# Patient Record
Sex: FEMALE | Race: WHITE | Employment: OTHER | URBAN - METROPOLITAN AREA
[De-identification: names, ages, dates, MRNs, and addresses within clinical notes are randomized per-mention and may not be internally consistent; named-entity substitution may affect disease eponyms.]

---

## 2021-07-15 ENCOUNTER — APPOINTMENT (OUTPATIENT)
Dept: GENERAL RADIOLOGY | Age: 86
End: 2021-07-15
Payer: MEDICARE

## 2021-07-15 ENCOUNTER — HOSPITAL ENCOUNTER (OUTPATIENT)
Age: 86
Setting detail: OBSERVATION
Discharge: HOME HEALTH | End: 2021-07-18
Attending: INTERNAL MEDICINE | Admitting: INTERNAL MEDICINE
Payer: MEDICARE

## 2021-07-15 DIAGNOSIS — R29.6 FREQUENT FALLS: ICD-10-CM

## 2021-07-15 DIAGNOSIS — R19.5 OCCULT BLOOD POSITIVE STOOL: ICD-10-CM

## 2021-07-15 DIAGNOSIS — R53.1 GENERAL WEAKNESS: ICD-10-CM

## 2021-07-15 DIAGNOSIS — R19.7 NAUSEA VOMITING AND DIARRHEA: Primary | ICD-10-CM

## 2021-07-15 DIAGNOSIS — R11.2 NAUSEA VOMITING AND DIARRHEA: Primary | ICD-10-CM

## 2021-07-15 DIAGNOSIS — S51.011A SKIN TEAR OF ELBOW WITHOUT COMPLICATION, RIGHT, INITIAL ENCOUNTER: ICD-10-CM

## 2021-07-15 PROCEDURE — 86850 RBC ANTIBODY SCREEN: CPT

## 2021-07-15 PROCEDURE — 73080 X-RAY EXAM OF ELBOW: CPT

## 2021-07-15 PROCEDURE — 86900 BLOOD TYPING SEROLOGIC ABO: CPT

## 2021-07-15 PROCEDURE — 83880 ASSAY OF NATRIURETIC PEPTIDE: CPT

## 2021-07-15 PROCEDURE — 83605 ASSAY OF LACTIC ACID: CPT

## 2021-07-15 PROCEDURE — 99282 EMERGENCY DEPT VISIT SF MDM: CPT

## 2021-07-15 PROCEDURE — 85610 PROTHROMBIN TIME: CPT

## 2021-07-15 PROCEDURE — 85025 COMPLETE CBC W/AUTO DIFF WBC: CPT

## 2021-07-15 PROCEDURE — 71045 X-RAY EXAM CHEST 1 VIEW: CPT

## 2021-07-15 PROCEDURE — 80053 COMPREHEN METABOLIC PANEL: CPT

## 2021-07-15 PROCEDURE — 84484 ASSAY OF TROPONIN QUANT: CPT

## 2021-07-15 PROCEDURE — 86901 BLOOD TYPING SEROLOGIC RH(D): CPT

## 2021-07-15 PROCEDURE — 83690 ASSAY OF LIPASE: CPT

## 2021-07-15 RX ORDER — 0.9 % SODIUM CHLORIDE 0.9 %
1000 INTRAVENOUS SOLUTION INTRAVENOUS ONCE
Status: COMPLETED | OUTPATIENT
Start: 2021-07-15 | End: 2021-07-16

## 2021-07-15 RX ORDER — GABAPENTIN 600 MG/1
TABLET ORAL
COMMUNITY
End: 2021-07-16

## 2021-07-15 RX ORDER — OMEPRAZOLE 20 MG/1
CAPSULE, DELAYED RELEASE ORAL
COMMUNITY

## 2021-07-15 RX ORDER — NITROFURANTOIN 25; 75 MG/1; MG/1
CAPSULE ORAL
Status: ON HOLD | COMMUNITY
End: 2021-07-18 | Stop reason: HOSPADM

## 2021-07-15 RX ORDER — HYDRALAZINE HYDROCHLORIDE 25 MG/1
TABLET, FILM COATED ORAL
COMMUNITY
End: 2021-07-16

## 2021-07-15 RX ORDER — LEVOTHYROXINE SODIUM 0.1 MG/1
TABLET ORAL
COMMUNITY
Start: 2021-05-07

## 2021-07-15 RX ORDER — CEPHALEXIN 500 MG/1
CAPSULE ORAL
COMMUNITY
End: 2021-07-16

## 2021-07-15 RX ORDER — ALENDRONATE SODIUM 70 MG/1
TABLET ORAL
COMMUNITY
Start: 2021-04-28

## 2021-07-15 RX ORDER — LISINOPRIL 40 MG/1
TABLET ORAL
COMMUNITY
Start: 2020-11-17

## 2021-07-15 RX ORDER — ACETAMINOPHEN 500 MG
500 TABLET ORAL EVERY 6 HOURS PRN
COMMUNITY
Start: 2021-02-12

## 2021-07-15 RX ORDER — AMIODARONE HYDROCHLORIDE 200 MG/1
TABLET ORAL
COMMUNITY
Start: 2021-03-04

## 2021-07-15 RX ORDER — AMLODIPINE BESYLATE 10 MG/1
TABLET ORAL
COMMUNITY
Start: 2020-12-22

## 2021-07-15 RX ORDER — FLUTICASONE PROPIONATE 50 MCG
SPRAY, SUSPENSION (ML) NASAL
COMMUNITY
End: 2021-07-16

## 2021-07-15 RX ORDER — WARFARIN SODIUM 2.5 MG/1
TABLET ORAL
COMMUNITY

## 2021-07-15 RX ORDER — ONDANSETRON 4 MG/1
4 TABLET, ORALLY DISINTEGRATING ORAL EVERY 6 HOURS PRN
COMMUNITY
Start: 2021-07-15

## 2021-07-15 ASSESSMENT — ENCOUNTER SYMPTOMS
DIARRHEA: 1
NAUSEA: 1
ABDOMINAL PAIN: 0
SHORTNESS OF BREATH: 0
VOMITING: 1

## 2021-07-16 ENCOUNTER — APPOINTMENT (OUTPATIENT)
Dept: CT IMAGING | Age: 86
End: 2021-07-16
Payer: MEDICARE

## 2021-07-16 PROBLEM — K52.9 ACUTE GASTROENTERITIS: Status: ACTIVE | Noted: 2021-07-16

## 2021-07-16 LAB
A/G RATIO: 1.1 (ref 1.1–2.2)
ABO/RH: NORMAL
ALBUMIN SERPL-MCNC: 3.1 G/DL (ref 3.4–5)
ALP BLD-CCNC: 53 U/L (ref 40–129)
ALT SERPL-CCNC: 14 U/L (ref 10–40)
ANION GAP SERPL CALCULATED.3IONS-SCNC: 13 MMOL/L (ref 3–16)
ANTIBODY SCREEN: NORMAL
AST SERPL-CCNC: 27 U/L (ref 15–37)
BACTERIA: ABNORMAL /HPF
BASOPHILS ABSOLUTE: 0 K/UL (ref 0–0.2)
BASOPHILS RELATIVE PERCENT: 0.3 %
BILIRUB SERPL-MCNC: 0.4 MG/DL (ref 0–1)
BILIRUBIN URINE: NEGATIVE
BLOOD, URINE: ABNORMAL
BUN BLDV-MCNC: 23 MG/DL (ref 7–20)
C DIFF TOXIN/ANTIGEN: NORMAL
CALCIUM SERPL-MCNC: 7.7 MG/DL (ref 8.3–10.6)
CHLORIDE BLD-SCNC: 101 MMOL/L (ref 99–110)
CLARITY: ABNORMAL
CO2: 19 MMOL/L (ref 21–32)
COLOR: YELLOW
COMMENT UA: ABNORMAL
CREAT SERPL-MCNC: 0.9 MG/DL (ref 0.6–1.2)
EKG ATRIAL RATE: 88 BPM
EKG DIAGNOSIS: NORMAL
EKG P AXIS: 74 DEGREES
EKG P-R INTERVAL: 136 MS
EKG Q-T INTERVAL: 460 MS
EKG QRS DURATION: 90 MS
EKG QTC CALCULATION (BAZETT): 556 MS
EKG R AXIS: 43 DEGREES
EKG T AXIS: 53 DEGREES
EKG VENTRICULAR RATE: 88 BPM
EOSINOPHILS ABSOLUTE: 0 K/UL (ref 0–0.6)
EOSINOPHILS RELATIVE PERCENT: 0 %
GFR AFRICAN AMERICAN: >60
GFR NON-AFRICAN AMERICAN: 59
GLOBULIN: 2.9 G/DL
GLUCOSE BLD-MCNC: 131 MG/DL (ref 70–99)
GLUCOSE URINE: NEGATIVE MG/DL
HCT VFR BLD CALC: 34.8 % (ref 36–48)
HCT VFR BLD CALC: 36.2 % (ref 36–48)
HCT VFR BLD CALC: 36.8 % (ref 36–48)
HCT VFR BLD CALC: 37.3 % (ref 36–48)
HEMOGLOBIN: 11.6 G/DL (ref 12–16)
HEMOGLOBIN: 11.9 G/DL (ref 12–16)
HEMOGLOBIN: 12 G/DL (ref 12–16)
HEMOGLOBIN: 12.4 G/DL (ref 12–16)
INR BLD: 1.97 (ref 0.88–1.12)
KETONES, URINE: NEGATIVE MG/DL
LACTIC ACID, SEPSIS: 0.9 MMOL/L (ref 0.4–1.9)
LEUKOCYTE ESTERASE, URINE: ABNORMAL
LIPASE: 12 U/L (ref 13–60)
LYMPHOCYTES ABSOLUTE: 0.4 K/UL (ref 1–5.1)
LYMPHOCYTES RELATIVE PERCENT: 5.8 %
MCH RBC QN AUTO: 32.3 PG (ref 26–34)
MCHC RBC AUTO-ENTMCNC: 32.7 G/DL (ref 31–36)
MCV RBC AUTO: 98.9 FL (ref 80–100)
MICROSCOPIC EXAMINATION: YES
MONOCYTES ABSOLUTE: 0.7 K/UL (ref 0–1.3)
MONOCYTES RELATIVE PERCENT: 9.6 %
MUCUS: ABNORMAL /LPF
NEUTROPHILS ABSOLUTE: 6.4 K/UL (ref 1.7–7.7)
NEUTROPHILS RELATIVE PERCENT: 84.3 %
NITRITE, URINE: NEGATIVE
OCCULT BLOOD DIAGNOSTIC: ABNORMAL
PDW BLD-RTO: 14.2 % (ref 12.4–15.4)
PH UA: 6 (ref 5–8)
PLATELET # BLD: 267 K/UL (ref 135–450)
PMV BLD AUTO: 6.5 FL (ref 5–10.5)
POTASSIUM REFLEX MAGNESIUM: 3.8 MMOL/L (ref 3.5–5.1)
PRO-BNP: 716 PG/ML (ref 0–449)
PROTEIN UA: 30 MG/DL
PROTHROMBIN TIME: 22.9 SEC (ref 9.9–12.7)
RBC # BLD: 3.73 M/UL (ref 4–5.2)
RBC UA: ABNORMAL /HPF (ref 0–4)
SODIUM BLD-SCNC: 133 MMOL/L (ref 136–145)
SPECIFIC GRAVITY UA: 1.01 (ref 1–1.03)
TOTAL PROTEIN: 6 G/DL (ref 6.4–8.2)
TROPONIN: <0.01 NG/ML
URINE PATH CONSULT: YES
URINE REFLEX TO CULTURE: ABNORMAL
URINE TYPE: ABNORMAL
UROBILINOGEN, URINE: 0.2 E.U./DL
WAXY CASTS: ABNORMAL /LPF
WBC # BLD: 7.6 K/UL (ref 4–11)
WBC UA: ABNORMAL /HPF (ref 0–5)

## 2021-07-16 PROCEDURE — 6370000000 HC RX 637 (ALT 250 FOR IP): Performed by: INTERNAL MEDICINE

## 2021-07-16 PROCEDURE — 85018 HEMOGLOBIN: CPT

## 2021-07-16 PROCEDURE — 74177 CT ABD & PELVIS W/CONTRAST: CPT

## 2021-07-16 PROCEDURE — 36415 COLL VENOUS BLD VENIPUNCTURE: CPT

## 2021-07-16 PROCEDURE — 93005 ELECTROCARDIOGRAM TRACING: CPT | Performed by: PHYSICIAN ASSISTANT

## 2021-07-16 PROCEDURE — G0378 HOSPITAL OBSERVATION PER HR: HCPCS

## 2021-07-16 PROCEDURE — 87449 NOS EACH ORGANISM AG IA: CPT

## 2021-07-16 PROCEDURE — 97166 OT EVAL MOD COMPLEX 45 MIN: CPT

## 2021-07-16 PROCEDURE — 87505 NFCT AGENT DETECTION GI: CPT

## 2021-07-16 PROCEDURE — 2580000003 HC RX 258: Performed by: PHYSICIAN ASSISTANT

## 2021-07-16 PROCEDURE — 6360000004 HC RX CONTRAST MEDICATION: Performed by: PHYSICIAN ASSISTANT

## 2021-07-16 PROCEDURE — G0328 FECAL BLOOD SCRN IMMUNOASSAY: HCPCS

## 2021-07-16 PROCEDURE — 70450 CT HEAD/BRAIN W/O DYE: CPT

## 2021-07-16 PROCEDURE — 87324 CLOSTRIDIUM AG IA: CPT

## 2021-07-16 PROCEDURE — 72125 CT NECK SPINE W/O DYE: CPT

## 2021-07-16 PROCEDURE — 93010 ELECTROCARDIOGRAM REPORT: CPT | Performed by: INTERNAL MEDICINE

## 2021-07-16 PROCEDURE — 81001 URINALYSIS AUTO W/SCOPE: CPT

## 2021-07-16 PROCEDURE — 85014 HEMATOCRIT: CPT

## 2021-07-16 PROCEDURE — 97161 PT EVAL LOW COMPLEX 20 MIN: CPT

## 2021-07-16 PROCEDURE — 2580000003 HC RX 258: Performed by: INTERNAL MEDICINE

## 2021-07-16 RX ORDER — ACETAMINOPHEN 325 MG/1
650 TABLET ORAL EVERY 6 HOURS PRN
Status: DISCONTINUED | OUTPATIENT
Start: 2021-07-16 | End: 2021-07-18 | Stop reason: HOSPADM

## 2021-07-16 RX ORDER — SODIUM CHLORIDE 0.9 % (FLUSH) 0.9 %
5-40 SYRINGE (ML) INJECTION EVERY 12 HOURS SCHEDULED
Status: DISCONTINUED | OUTPATIENT
Start: 2021-07-16 | End: 2021-07-18 | Stop reason: HOSPADM

## 2021-07-16 RX ORDER — LISINOPRIL 20 MG/1
40 TABLET ORAL DAILY
Status: DISCONTINUED | OUTPATIENT
Start: 2021-07-16 | End: 2021-07-18 | Stop reason: HOSPADM

## 2021-07-16 RX ORDER — DONEPEZIL HYDROCHLORIDE 5 MG/1
5 TABLET, FILM COATED ORAL NIGHTLY
Status: DISCONTINUED | OUTPATIENT
Start: 2021-07-16 | End: 2021-07-18 | Stop reason: HOSPADM

## 2021-07-16 RX ORDER — LEVOTHYROXINE SODIUM 0.1 MG/1
100 TABLET ORAL DAILY
Status: DISCONTINUED | OUTPATIENT
Start: 2021-07-16 | End: 2021-07-18 | Stop reason: HOSPADM

## 2021-07-16 RX ORDER — AMLODIPINE BESYLATE 5 MG/1
10 TABLET ORAL DAILY
Status: DISCONTINUED | OUTPATIENT
Start: 2021-07-16 | End: 2021-07-18 | Stop reason: HOSPADM

## 2021-07-16 RX ORDER — PREDNISONE 1 MG/1
5 TABLET ORAL
Status: DISCONTINUED | OUTPATIENT
Start: 2021-07-17 | End: 2021-07-18 | Stop reason: HOSPADM

## 2021-07-16 RX ORDER — DONEPEZIL HYDROCHLORIDE 5 MG/1
5 TABLET, FILM COATED ORAL NIGHTLY
COMMUNITY

## 2021-07-16 RX ORDER — MECLIZINE HCL 12.5 MG/1
12.5 TABLET ORAL 3 TIMES DAILY PRN
Status: DISCONTINUED | OUTPATIENT
Start: 2021-07-16 | End: 2021-07-18 | Stop reason: HOSPADM

## 2021-07-16 RX ORDER — WARFARIN SODIUM 2 MG/1
2 TABLET ORAL DAILY
Status: DISCONTINUED | OUTPATIENT
Start: 2021-07-16 | End: 2021-07-18 | Stop reason: HOSPADM

## 2021-07-16 RX ORDER — PANTOPRAZOLE SODIUM 40 MG/10ML
40 INJECTION, POWDER, LYOPHILIZED, FOR SOLUTION INTRAVENOUS DAILY
Status: DISCONTINUED | OUTPATIENT
Start: 2021-07-16 | End: 2021-07-18 | Stop reason: HOSPADM

## 2021-07-16 RX ORDER — ONDANSETRON 4 MG/1
4 TABLET, ORALLY DISINTEGRATING ORAL EVERY 8 HOURS PRN
Status: CANCELLED | OUTPATIENT
Start: 2021-07-16

## 2021-07-16 RX ORDER — METOPROLOL SUCCINATE 25 MG/1
25 TABLET, EXTENDED RELEASE ORAL DAILY
Status: DISCONTINUED | OUTPATIENT
Start: 2021-07-16 | End: 2021-07-18 | Stop reason: HOSPADM

## 2021-07-16 RX ORDER — SODIUM CHLORIDE, SODIUM LACTATE, POTASSIUM CHLORIDE, CALCIUM CHLORIDE 600; 310; 30; 20 MG/100ML; MG/100ML; MG/100ML; MG/100ML
INJECTION, SOLUTION INTRAVENOUS CONTINUOUS
Status: ACTIVE | OUTPATIENT
Start: 2021-07-16 | End: 2021-07-17

## 2021-07-16 RX ORDER — ONDANSETRON 2 MG/ML
4 INJECTION INTRAMUSCULAR; INTRAVENOUS EVERY 6 HOURS PRN
Status: CANCELLED | OUTPATIENT
Start: 2021-07-16

## 2021-07-16 RX ORDER — HYDRALAZINE HYDROCHLORIDE 25 MG/1
75 TABLET, FILM COATED ORAL 2 TIMES DAILY
Status: DISCONTINUED | OUTPATIENT
Start: 2021-07-16 | End: 2021-07-18 | Stop reason: HOSPADM

## 2021-07-16 RX ORDER — ACETAMINOPHEN 650 MG/1
650 SUPPOSITORY RECTAL EVERY 6 HOURS PRN
Status: DISCONTINUED | OUTPATIENT
Start: 2021-07-16 | End: 2021-07-18 | Stop reason: HOSPADM

## 2021-07-16 RX ORDER — SODIUM CHLORIDE 9 MG/ML
25 INJECTION, SOLUTION INTRAVENOUS PRN
Status: DISCONTINUED | OUTPATIENT
Start: 2021-07-16 | End: 2021-07-18 | Stop reason: HOSPADM

## 2021-07-16 RX ORDER — POLYETHYLENE GLYCOL 3350 17 G/17G
17 POWDER, FOR SOLUTION ORAL DAILY PRN
Status: DISCONTINUED | OUTPATIENT
Start: 2021-07-16 | End: 2021-07-18 | Stop reason: HOSPADM

## 2021-07-16 RX ORDER — SODIUM CHLORIDE 0.9 % (FLUSH) 0.9 %
10 SYRINGE (ML) INJECTION PRN
Status: DISCONTINUED | OUTPATIENT
Start: 2021-07-16 | End: 2021-07-18 | Stop reason: HOSPADM

## 2021-07-16 RX ADMIN — WARFARIN SODIUM 2 MG: 2 TABLET ORAL at 19:46

## 2021-07-16 RX ADMIN — DONEPEZIL HYDROCHLORIDE 5 MG: 5 TABLET, FILM COATED ORAL at 19:46

## 2021-07-16 RX ADMIN — Medication 10 ML: at 19:47

## 2021-07-16 RX ADMIN — HYDRALAZINE HYDROCHLORIDE 75 MG: 25 TABLET, FILM COATED ORAL at 19:47

## 2021-07-16 RX ADMIN — METOPROLOL SUCCINATE 25 MG: 25 TABLET, EXTENDED RELEASE ORAL at 19:52

## 2021-07-16 RX ADMIN — SODIUM CHLORIDE, POTASSIUM CHLORIDE, SODIUM LACTATE AND CALCIUM CHLORIDE: 600; 310; 30; 20 INJECTION, SOLUTION INTRAVENOUS at 22:57

## 2021-07-16 RX ADMIN — SODIUM CHLORIDE, POTASSIUM CHLORIDE, SODIUM LACTATE AND CALCIUM CHLORIDE: 600; 310; 30; 20 INJECTION, SOLUTION INTRAVENOUS at 06:52

## 2021-07-16 RX ADMIN — ACETAMINOPHEN 650 MG: 325 TABLET ORAL at 06:56

## 2021-07-16 RX ADMIN — Medication 10 ML: at 10:08

## 2021-07-16 RX ADMIN — ACETAMINOPHEN 650 MG: 325 TABLET ORAL at 19:47

## 2021-07-16 RX ADMIN — LISINOPRIL 40 MG: 20 TABLET ORAL at 09:08

## 2021-07-16 RX ADMIN — LEVOTHYROXINE SODIUM 100 MCG: 0.1 TABLET ORAL at 06:56

## 2021-07-16 RX ADMIN — IOPAMIDOL 75 ML: 755 INJECTION, SOLUTION INTRAVENOUS at 00:40

## 2021-07-16 RX ADMIN — SODIUM CHLORIDE 1000 ML: 9 INJECTION, SOLUTION INTRAVENOUS at 01:30

## 2021-07-16 RX ADMIN — AMLODIPINE BESYLATE 10 MG: 5 TABLET ORAL at 09:08

## 2021-07-16 ASSESSMENT — PAIN SCALES - GENERAL
PAINLEVEL_OUTOF10: 0
PAINLEVEL_OUTOF10: 5
PAINLEVEL_OUTOF10: 0

## 2021-07-16 NOTE — ED TRIAGE NOTES
Pt seen  At  today :  [facility] ED Note    Date of service: 7/15/2021    Reason for Visit: Medical Problem (pt. c/o not feeling well, pt.  sts. pt. may have food poisoning. )    Patient History     HPI Doyle Barber is a 26-year-old female who presented to the emergency department for evaluation of nausea and vomiting. She stated her  has had similar symptoms and were concerned for patient have food poisoning or gastroenteritis. She has had a 1 to 2-day history of intermittent nausea and vomiting. She denies any abdominal pain. She reports no blood in emesis or blood in stool. She denies any cough, dyspnea shortness of breath. Past Medical History:   Diagnosis Date    Atrial fibrillation (CMS Dx) 01/02/2013   Dr Keyshawn Leslie Bladder cancer (CMS Dx) 0887R   treated and no complications since    Fall    Generalized headaches    GERD (gastroesophageal reflux disease)    Hypertension   Echo 3/25/10: normal LVEF, mild AI, mild MR. Normal adenosine myoview GXT 3/6/07.  Hypothyroidism    IBS (irritable bowel syndrome)   last colonoscopy 5/26/10, Dr. Veronica Arellano    Osteoarthritis   Back    Osteoporosis    Rheumatoid aortitis     Past Surgical History:   Procedure Laterality Date    CATARACT EXTRACTION W/ INTRAOCULAR LENS IMPLANT 3/22/13   right    HIP FRACTURE SURGERY Right 3/13    hip replacement     Patient reports that she has never smoked. She has never used smokeless tobacco. She reports that she does not drink alcohol and does not use drugs. Previous Medications   ACETAMINOPHEN (TYLENOL) 500 MG TABLET Take 1 tablet (500 mg total) by mouth every 6 hours as needed. ALENDRONATE (FOSAMAX) 70 MG TABLET Take 70 mg by mouth Every Saturday. AMIODARONE (PACERONE) 200 MG TABLET Take 100 mg by mouth daily. AMLODIPINE (NORVASC) 10 MG TABLET Take 1 tablet (10 mg total) by mouth daily. DONEPEZIL (ARICEPT) 5 MG TABLET Take 1 tablet (5 mg total) by mouth daily.  Indications: MEMORY HYDRALAZINE (APRESOLINE) 25 MG TABLET Take 75 mg by mouth in the morning and at bedtime. LEVOTHYROXINE (SYNTHROID) 100 MCG TABLET Take 1 tablet (100 mcg total) by mouth daily. Indications: hypothyroidism   LIDOCAINE (LIDODERM) 5 % Place 1 patch onto the skin daily. Apply patch for 12 hours and then remove patch and leave off for 12 hours. Indications: apply to arm   LISINOPRIL (PRINIVIL,ZESTRIL) 40 MG TABLET Take 40 mg by mouth daily with dinner. METOPROLOL SUCCINATE (TOPROL-XL) 25 MG 24 HR TABLET Take 25 mg by mouth daily. MIRTAZAPINE (REMERON) 15 MG TABLET Take 1 tablet (15 mg total) by mouth at bedtime. Indications: MOOD   OMEPRAZOLE (PRILOSEC) 20 MG CAPSULE Take 20 mg by mouth every other day. PREDNISONE (DELTASONE) 5 MG TABLET Take 5 mg by mouth daily. PSYLLIUM (METAMUCIL) POWDER Take 1 packet by mouth daily as needed. WARFARIN SODIUM (WARFARIN ORAL) Take by mouth See Admin Instructions. Warfarin Therapy as of 2/12/21:  Indication: a.fib  Current dose and frequency: 2.5 mg every evening with dinner  Last dose taken: 2/11/21  Missed doses (past 7 days): 0  Provider or clinic that manages warfarin therapy:   Goal INR range: 2-2.5  Patient has 2.5 mg (tablet strength) tablets        Allergies:    Allergies as of 07/15/2021 - Fully Reviewed 07/15/2021   Allergen Reaction Noted    Clonidine Swelling 07/01/2013    Cymbalta [duloxetine] 03/08/2019    Gabapentin 04/22/2019    Sertraline Nausea Only 01/30/2019    Statins-hmg-coa reductase inhibitors 12/10/2012    Sulfa (sulfonamide antibiotics) 12/10/2012     Family history has been reviewed electronic medical record  Review of Systems     Review of Systems  Constitutional: See HPI  Eyes: Denies change in visual acuity   HENT: Denies nasal congestion or sore throat   Respiratory: Denies cough or shortness of breath   Cardiovascular: Denies chest pain or edema   GI: See HPI  : Denies dysuria   Musculoskeletal: Denies back pain or joint pain   Integument: Denies rash   Neurologic: Denies headache, focal weakness or sensory changes   Endocrine: Denies polyuria or polydipsia   Lymphatic: Denies swollen glands   Psychiatric: Denies depression or anxiety    Physical Exam     ED Triage Vitals [07/15/21 1128]   Vital Signs Group   Temp 99.4 °F (37.4 °C)   Temp Source Oral   Heart Rate 86   Heart Rate Source Monitor   Resp 18   SpO2 92 %   BP   MAP (mmHg)   BP Location Right arm   BP Method Automatic   Patient Position Sitting   SpO2 92 %   O2 Device None (Room air)     Physical Exam    Constitutional: Well developed, well nourished, no acute distress, non-toxic appearance   Eyes: PERRL, conjunctiva normal sclera are white and extraocular wounds are intact  HENT: Atraumatic, external ears normal, TMs are clear bilateral nose normal, oropharynx moist, no pharyngeal exudates. Neck- normal range of motion, no tenderness, supple   Respiratory: No respiratory distress, normal breath sounds, no rales, no wheezing   Cardiovascular: Normal rate, normal rhythm, no murmurs, no gallops, no rubs   GI: Soft, nondistended, normal bowel sounds, nontender, no organomegaly, no mass, no rebound, no guarding   : No costovertebral angle tenderness   Musculoskeletal: No edema, no tenderness, no deformities.  Back- no tenderness  Integument: Well hydrated, no rash   Neurologic: Alert & oriented x 3, CN 2-12 normal, normal motor function, normal sensory function, no focal deficits noted   Psychiatric: Speech and behavior appropriate  Diagnostic Studies     Labs:    Please see electronic medical record for any tests performed in the ED    Radiology:    Please see electronic medical record for any tests performed in the ED    EKG:  EKG Interpretation    Interpreted by me    Rhythm: normal sinus   Rate:80  Axis: normal  Ectopy: none  Conduction: normal  ST Segments: no acute change  T Waves: no acute change  Q Waves: none    Clinical Impression: no acute changes and normal EKG    Emergency Department Procedures     Procedures none    ED Course and MDM     Jennifer Cummins is a 80 y.o. female who presented to the emergency department with Medical Problem (pt. c/o not feeling well, pt.  sts. pt. may have food poisoning. )  The patient presented to the emergency department for concerns of gastroenteritis. The patient was having intermittent nausea vomiting presents for evaluation assessment for concerns of dehydration and vomiting. The patient presentation EKG which demonstrates sinus rhythm normal axis with no acute changes. The patient will be treated acutely for concerns of dehydration. She received IV fluid hydration. The patient was given IV Zofran. The patient had a initial screen labs obtained with a normal CBC, renal, LFTs and lipase. The patient high-sensitivity troponin are negative and UA is negative. The patient was discharged home outpatient follow-up with discharge instructions regarding gastroenteritis and nausea vomiting.       Critical Care Time (Attendings)   None  Final Impression is gastroenteritis  Disposition is discharged home      Carissa Casey MD  07/15/21 1425      Electronically signed by Carissa Casey MD at 07/15/2021 2:25 PM EDT

## 2021-07-16 NOTE — PROGRESS NOTES
Occupational Therapy   Occupational Therapy Initial Assessment  Date: 2021   Patient Name: Osvaldo Marroquin  MRN: [id number]     : 1929    Date of Service: 2021    Discharge Recommendations:     OT Equipment Recommendations  Equipment Needed: Yes  Other: rw, shower seat  Osvaldo Marroquin scored a 18/24 on the AM-PAC ADL Inpatient form. Current research shows that an AM-PAC score of 18 or greater is typically associated with a discharge to the patient's home setting. Based on the patient's AM-PAC score, and their current ADL deficits, it is recommended that the patient have 2-3 sessions per week of Occupational Therapy at d/c to increase the patient's independence. At this time, this patient demonstrates the endurance and safety to discharge home   and a follow up treatment frequency of 2-3x/wk. Please see assessment section for further patient specific details. If patient discharges prior to next session this note will serve as a discharge summary. Please see below for the latest assessment towards goals. HOME HEALTH CARE: LEVEL 2 SOCIAL     - Initial home health evaluation to occur within 24-48 hours, in patient home   - Therapy to evaluate with goal of regaining prior level of functioning   - Therapy to evaluate if patient has [address] needs for personal care   -  evaluation within 24-48 hours, includes evaluation of resources and insurance to determine AL/IL/LTC/Medicaid options   - [place] [facility]ging Referral   - Da Figueroa to discuss home support and care needs post discharge within two weeks of discharge. Assessment   Performance deficits / Impairments: Decreased functional mobility ; Decreased cognition;Decreased high-level IADLs;Decreased ADL status; Decreased safe awareness  Treatment Diagnosis: decreased independence with ADL related to late affects of c diff  Prognosis: Good  Decision Making: Medium Complexity  OT Education: OT Role;Plan of Care  Patient Education: fall prevention with use of rw  Barriers to Learning: cognition  REQUIRES OT FOLLOW UP: Yes  Activity Tolerance  Activity Tolerance: Patient Tolerated treatment well           Patient Diagnosis(es): The primary encounter diagnosis was Nausea vomiting and diarrhea. Diagnoses of General weakness, Frequent falls, Skin tear of elbow without complication, right, initial encounter, and Occult blood positive stool were also pertinent to this visit. has a past medical history of bladder, Clostridium difficile infection, Hypercholesterolaemia, Hypertension, Hypothyroidism, IBS (irritable bowel syndrome), Osteoarthritis, and Osteoporosis. has a past surgical history that includes Bladder surgery.     Treatment Diagnosis: decreased independence with ADL related to late affects of c diff      Restrictions  Restrictions/Precautions  Restrictions/Precautions: Fall Risk, General Precautions, Contact Precautions    Subjective   General  Chart Reviewed: Yes  Family / Caregiver Present: No  Diagnosis: c-diff  Subjective  Subjective: Patient in bed and agreeable to therapy  Patient Currently in Pain: No  Pain Assessment  Pain Assessment: 0-10  Pain Level: 0  Vital Signs  Temp: 97.6 °F (36.4 °C)  Temp Source: Axillary  Pulse: 85  Heart Rate Source: Monitor  Resp: 18  BP: (!) 152/65  BP Location: Left upper arm  MAP (mmHg): 93  Patient Position: Semi fowlers  Level of Consciousness: Alert (0)  MEWS Score: 1  Patient Currently in Pain: No  Oxygen Therapy  SpO2: 94 %  Pulse Oximeter Device Mode: Intermittent  Pulse Oximeter Device Location: Finger  O2 Device: None (Room air)  Social/Functional History  Social/Functional History  Lives With: Spouse  Type of Home: House  Home Layout: Two level  Home Access: Stairs to enter without rails  Entrance Stairs - Number of Steps: 1  Bathroom Shower/Tub: Tub/Shower unit (tub bathes, bath mat)  Home Equipment: Quad cane  ADL Assistance: Independent  Homemaking Assistance: Independent  Ambulation Assistance: Independent  Transfer Assistance: Independent  Active : Yes (patient does not drive any more)  Additional Comments: patient reports two falls in the past 6 mos       Objective   Vision: Impaired  Vision Exceptions: Wears glasses for reading  Hearing: Exceptions to [facility]  Hearing Exceptions: Hard of hearing/hearing concerns;Bilateral hearing aid    Orientation  Overall Orientation Status: Impaired  Orientation Level: Disoriented to time  Observation/Palpation  Posture: Good  Balance  Sitting Balance: Supervision  Standing Balance: Stand by assistance  Standing Balance  Time: up to 5 minutes  Activity: ambulation in room  Comment: quad cane and rw, needed contact with quad cane, no contact with rw  Functional Mobility  Functional - Mobility Device: Rolling Walker  Activity: Other  Assist Level: Stand by assistance  Toilet Transfers  Toilet - Technique: Stand step  Toilet Transfer: Supervision  ADL  Grooming: Supervision  UE Bathing: Supervision  LE Bathing: Supervision  UE Dressing: Supervision  LE Dressing: Supervision  Toileting: Supervision  Additional Comments: recommend use of rw for safety during mobility        Bed mobility  Bridging: Supervision  Rolling to Left: Supervision  Rolling to Right: Supervision  Supine to Sit: Supervision  Sit to Supine: Supervision  Scooting: Supervision  Transfers  Sit to stand: Supervision  Stand to sit: Supervision     Cognition  Overall Cognitive Status: Exceptions  Arousal/Alertness: Appropriate responses to stimuli  Following Commands:  Follows one step commands consistently  Attention Span: Attends with cues to redirect  Memory: Decreased recall of biographical Information;Decreased recall of recent events;Decreased short term memory  Safety Judgement: Decreased awareness of need for safety  Problem Solving: Assistance required to generate solutions;Assistance required to implement solutions;Assistance required to identify errors made;Assistance required to correct errors made;Decreased awareness of errors  Insights: Decreased awareness of deficits  Initiation: Requires cues for some  Sequencing: Requires cues for some                                        Plan   Plan  Times per week: 3-5  Times per day: Daily  Current Treatment Recommendations: Functional Mobility Training, Balance Training, Self-Care / ADL, Patient/Caregiver Education & Training, Equipment Evaluation, Education, & procurement, Safety Education & Training    G-Code     OutComes Score                                                  AM-PAC Score        AM-PAC Inpatient Daily Activity Raw Score: 18 (07/16/21 1400)  AM-PAC Inpatient ADL T-Scale Score : 38.66 (07/16/21 1400)  ADL Inpatient CMS 0-100% Score: 46.65 (07/16/21 1400)  ADL Inpatient CMS G-Code Modifier : CK (07/16/21 1400)    Goals  Short term goals  Short term goal 1: modified indepenent toileting  Short term goal 2: modified independent functional mobility with use of rw  Short term goal 3: modified independent bathing, dressing  Patient Goals   Patient goals : patient's goal is to return home with        Therapy Time   Individual Concurrent Group Co-treatment   Time In [address] [place] [address] Drive         Time Out 1357         Minutes [address], OT

## 2021-07-16 NOTE — ED PROVIDER NOTES
Patient was seen and evaluated independently by JOSE. I was immediately available for consultation if needed. This note is provided for EKG interpretation only.     EKG:    EKG was reviewed by emergency department physician in the absence of a cardiologist    Narrow complex sinus rhythm, rate 88, normal axis, normal WA and QRS intervals, prolonged Qtc, no ST elevations or depressions, normal t-wave morphology, impression NSR, no STEMI       Soni Mcintosh DO  07/16/21 6263

## 2021-07-16 NOTE — PROGRESS NOTES
Physical Therapy    Facility/Department: [address]  Initial Assessment    NAME: Meenu White  : 1929  MRN: [id number]    Date of Service: 2021    Discharge Recommendations:Holly Marquez scored a 18/24 on the AM-PAC short mobility form. Current research shows that an AM-PAC score of 18 or greater is typically associated with a discharge to the patient's home setting. Based on the patient's AM-PAC score and their current functional mobility deficits, it is recommended that the patient have 2-3 sessions per week of Physical Therapy at d/c to increase the patient's independence. At this time, this patient demonstrates the endurance and safety to discharge home with (home services) and a follow up treatment frequency of 2-3x/wk. Please see assessment section for further patient specific details. If patient discharges prior to next session this note will serve as a discharge summary. Please see below for the latest assessment towards goals. HOME HEALTH CARE: LEVEL 3 SAFETY     - Initial home health evaluation to occur within 24-48 hours, in patient home   - Therapy evaluations in home within 24-48 hours of discharge; including DME and home safety   - Frontload therapy 5 days, then 3x a week   - Therapy to evaluate if patient has [address] needs for personal care   -  evaluation within 24-48 hours, includes evaluation of resources and insurance to determine AL, [place], LTC, and Medicaid options         PT Equipment Recommendations  Equipment Needed: Yes  Mobility Devices: Giancarlo Zafar: Rolling    Assessment   Body structures, Functions, Activity limitations: Decreased functional mobility ; Decreased ADL status; Decreased strength;Decreased balance;Decreased safe awareness;Decreased endurance  Assessment: Pt presents as below her baseline function and would benefit from skilled PT services to promote safe return to PLOF.   Prognosis: Good  Decision Making: Low Complexity  PT Education: Goals;PT Role;Plan of Care  Patient Education: D/C recommendations--pt verbalized understanding, would likely benefit from reinforcement  REQUIRES PT FOLLOW UP: Yes  Activity Tolerance  Activity Tolerance: Patient Tolerated treatment well       Patient Diagnosis(es): The primary encounter diagnosis was Nausea vomiting and diarrhea. Diagnoses of General weakness, Frequent falls, Skin tear of elbow without complication, right, initial encounter, and Occult blood positive stool were also pertinent to this visit. has a past medical history of bladder, Clostridium difficile infection, Hypercholesterolaemia, Hypertension, Hypothyroidism, IBS (irritable bowel syndrome), Osteoarthritis, and Osteoporosis. has a past surgical history that includes Bladder surgery. Restrictions  Restrictions/Precautions  Restrictions/Precautions: Fall Risk, General Precautions, Contact Precautions (High falls risk, Contact Plus)  Required Braces or Orthoses?: No  Position Activity Restriction  Other position/activity restrictions: Kody Andrews is a 80 y.o. female who presents to the emergency department for evaluation of generalized weakness, nausea, vomiting, diarrhea as well as fall today. Patient ate a pepperoni pizza and developed some abdominal pain, nausea and vomiting followed by diarrhea.  did not eat the same pizza and has not had similar symptoms. Patient states she is currently not feeling nauseated does not have any abdominal pain and has not had any diarrhea since arriving here in the emergency department. However family member state this has been ongoing for the past few days.  who is at bedside states she is so weak due to the diarrhea that she is unable to walk. She has had 3 falls at home today. 1 has resulted in a large skin tear to her right elbow. Patient has associated pain with movement of this elbow. Unsure if the patient hit her head. No loss of consciousness.   She is on a blood thinner due to atrial fibrillation. Patient was seen at [facility] earlier today, discharged home with Zofran.  states he is unable to care for the patient at home with her being so weak and he is afraid that she will continue to fall. He states that she has continued to have nonstop diarrhea at home. He states that she is not eating or drinking much by mouth. Denies any fevers.   Vision/Hearing  Vision: Impaired  Vision Exceptions: Wears glasses for reading  Hearing: Exceptions to [facility]  Hearing Exceptions: Hard of hearing/hearing concerns;Bilateral hearing aid     Subjective  General  Chart Reviewed: Yes  Response To Previous Treatment: Not applicable  Family / Caregiver Present: No  Diagnosis: Acute gastroenteritis  Follows Commands: Within Functional Limits  General Comment  Comments: Pt supine in bed upon arrival.  Subjective  Subjective: Pt agreeable to PT/OT eval.  Pain Screening  Patient Currently in Pain: Denies  Vital Signs  Patient Currently in Pain: Denies       Orientation  Orientation  Overall Orientation Status: Within Functional Limits (oriented to year, not to month)  Social/Functional History  Social/Functional History  Lives With: Spouse  Type of Home: House  Home Layout: Two level  Home Access: Stairs to enter without rails  Entrance Stairs - Number of Steps: 1  Bathroom Shower/Tub: Tub/Shower unit (tub bathes, bath mat)  Home Equipment: Quad cane  ADL Assistance: Independent  Homemaking Assistance: Independent  Ambulation Assistance: Independent  Transfer Assistance: Independent  Active : Yes (patient does not drive any more)  Additional Comments: patient reports two falls in the past 6 mos  Cognition        Objective     Observation/Palpation  Posture: Good    AROM RLE (degrees)  RLE AROM: WFL  AROM LLE (degrees)  LLE AROM : WFL  Strength RLE  Strength RLE: WFL  Comment: Grossly 4+/5  Strength LLE  Strength LLE: WFL  Comment: Grossly 4-/5  Tone RLE  RLE Tone: Normotonic  Tone LLE  LLE Tone: Normotonic  Motor Control  Gross Motor?: WFL  Sensation  Overall Sensation Status: WFL  Bed mobility  Bridging: Supervision  Rolling to Left: Supervision  Rolling to Right: Supervision  Supine to Sit: Supervision  Sit to Supine: Supervision  Scooting: Supervision  Transfers  Sit to Stand: Stand by assistance  Stand to sit: Stand by assistance  Ambulation  Ambulation?: Yes  More Ambulation?: Yes  Ambulation 1  Surface: level tile  Device: Small Christian Dunreith  Assistance: Contact guard assistance  Quality of Gait: Pt ambulates with slightly decreased step length, L ankle inversion, narrow ROBI, unsteadiness of gait during turns, no LOB. Distance: ~30 ft  Comments: Pt mildly unsteady throughout ambulation  Ambulation 2  Surface - 2: level tile  Device 2: Rolling Walker  Assistance 2: Stand by assistance  Quality of Gait 2: Pt demonstrates improved overall stability of gait, maintains decreased ella, decreased inversion of LLE, no LOB. Distance: ~40 ft  Stairs/Curb  Stairs?: No     Balance  Posture: Good  Sitting - Static: Good  Sitting - Dynamic: Good  Standing - Static: Fair;+  Standing - Dynamic: [address]  Times per week: 3-5x  Times per day: Daily  Current Treatment Recommendations: Strengthening, ROM, Balance Training, Functional Mobility Training, Transfer Training, Endurance Training, Gait Training, Stair training, Safety Education & Training, Patient/Caregiver Education & Training  Safety Devices  Type of devices:  All fall risk precautions in place, Gait belt, Patient at risk for falls, Nurse notified, Bed alarm in place, Call light within reach, Left in bed, Telesitter in use  Restraints  Initially in place: No    G-Code       OutComes Score                                                  AM-PAC Score  AM-PAC Inpatient Mobility Raw Score : 18 (07/16/21 1436)  AM-PAC Inpatient T-Scale Score : 43.63 (07/16/21 1436)  Mobility Inpatient CMS 0-100% Score: 46.58 (07/16/21 1436)  Mobility Inpatient CMS G-Code Modifier : CK (07/16/21 1436)          Goals  Short term goals  Time Frame for Short term goals:  To be met prior to discharge  Short term goal 1: Pt will complete bed mobility with mod I  Short term goal 2: Pt will complete sit to/from stand with mod I  Short term goal 3: Pt will ambulate 100 ft with LRAD and mod I  Short term goal 4: Pt will navigate up/down 4 steps with HR and supervision       Therapy Time   Individual Concurrent Group Co-treatment   Time In 1328         Time Out 1357         Minutes 29         Timed Code Treatment Minutes: [facility], PT   [facility], PT, DPT, [id number]

## 2021-07-16 NOTE — ED TRIAGE NOTES
states she is weak and still has diarrhea. ABCs intact and weak. Pt fell and has skin tear right upper arm, dressing in place . States she has fallen 3 times in the last 2 days d/t weakness and diarrhea.  No LOS before or after any of the falls

## 2021-07-16 NOTE — FLOWSHEET NOTE
Received referral from nurse for emotional support and facilitate decision making for pt & spouse. Pt & spouse have been  30 years and have been living in their home by themselves. This nurse recommended pt & spouse will benefit from living in an assisted living facility or home care assistance. This  made a brief visit with pt and spouse. Pt is anxious about her health and outcome. Spouse is very supportive and concerned for her. They appear indispose to have conversation about new living arrangements post discharge. This  offered emotional support and blessing per pt's request. This  recommendations on going spiritual and emotional support as well as consult with social work.     Memory Digna KEVIN- (Pager: [phone])

## 2021-07-16 NOTE — H&P
Hospital Medicine History and Physical    7/16/2021    Date of Admission: 7/16/2021    Date of Service: Pt seen/examined on 7/16/2021 and admitted to observation. Assessment/plan:  1. Acute gastroenteritis. Currently appears to be resolving. Checking stool studies for C. difficile, GI bacterial pathogen. Start intravenous fluid. Monitor volume status closely. Antiemetics in place. 2. Positive occult stool in the emergency room. Hemoglobin is stable. Start IV Protonix 40 mg daily. Consider GI consult to assist with evaluation. Avoid antiplatelet and anticoagulants; currently holding Coumadin. 3. Generalized weakness. Likely secondary to underlying acute medical conditions. Maintain on fall precautions. Therapy consult to assist with evaluation. 4. Prolonged QTC noted on EKG. Avoid QTprolonging medications. Maintain on telemetry. 5. Recurrent falls at home. As noted above, maintain on fall precautions. Therapy consults. 6. Other comorbidities: history of atrial fibrillation (on Coumadin), C. difficile infection, hyperlipidemia, essential hypertension, hypothyroidism, IBS, osteoporosis, osteoarthritis. Activities: Up with assist  Prophylaxis: SCDs, PPI  Code status: Full code.    ==========================================================  Chief complaint:  Diarrhea    History of Presenting Illness: This is a pleasant 80 y.o. female with history of atrial fibrillation, C. difficile infection, hyperlipidemia, essential hypertension, hypothyroidism, IBS, osteoporosis, osteoarthritis, who presents to the emergency room with complaints of generalized weakness, recurrent falls. Patient has had ongoing nausea, vomiting, diarrhea for nearly a week after eating pizza with pepperoni. She has now has decreased p.o. intake over the last few days. Somehow, occult stool was checked in the emergency room and was positive.   CTabdomen/pelvis with no acute pathology; has multiple chronic findings. Hospital medicine service consulted for admission. Past Medical History:      Diagnosis Date    bladder     Clostridium difficile infection     Hypercholesterolaemia     Pt was on medication for high cholesterol but was taken off by doctor    Hypertension     Hypothyroidism     IBS (irritable bowel syndrome)     Osteoarthritis     Osteoporosis        Past Surgical History:      Procedure Laterality Date    BLADDER SURGERY      For removal of cancerous tumors 1990; no recurrence since       Medications (prior to admission):  Prior to Admission medications    Medication Sig Start Date End Date Taking? Authorizing Provider   donepezil (ARICEPT) 5 MG tablet Take 5 mg by mouth nightly   Yes Historical Provider, MD   acetaminophen (TYLENOL) 500 MG tablet Take 500 mg by mouth every 6 hours as needed 2/12/21  Yes Historical Provider, MD   alendronate (FOSAMAX) 70 MG tablet alendronate 70 mg tablet 4/28/21  Yes Historical Provider, MD   amiodarone (CORDARONE) 200 MG tablet amiodarone 200 mg tablet 3/4/21  Yes Historical Provider, MD   amLODIPine (NORVASC) 10 MG tablet amlodipine 10 mg tablet 12/22/20  Yes Historical Provider, MD   ondansetron (ZOFRAN-ODT) 4 MG disintegrating tablet Take 4 mg by mouth every 6 hours as needed 7/15/21  Yes Historical Provider, MD   warfarin (COUMADIN) 2.5 MG tablet Coumadin 2.5 mg tablet   Yes Historical Provider, MD   levothyroxine (SYNTHROID) 100 MCG tablet levothyroxine 100 mcg tablet 5/7/21  Yes Historical Provider, MD   lisinopril (PRINIVIL;ZESTRIL) 40 MG tablet lisinopril 40 mg tablet 11/17/20  Yes Historical Provider, MD   nitrofurantoin, macrocrystal-monohydrate, (MACROBID) 100 MG capsule nitrofurantoin monohydrate/macrocrystals 100 mg capsule    Historical Provider, MD   omeprazole (PRILOSEC) 20 MG delayed release capsule omeprazole 20 mg capsule,delayed release    Historical Provider, MD   niacin 500 MG CR capsule Take 500 mg by mouth nightly.  2 tabs daily Historical Provider, MD   Multiple Vitamins-Calcium (ONE-A-DAY WOMENS PO) Take  by mouth. Historical Provider, MD   Psyllium (METAMUCIL PO) Take  by mouth. 1 teaspoon in water at varying times of the day as needed     Historical Provider, MD       Allergy(ies):  Duloxetine, Sertraline, and Sulfa antibiotics    Social History:  TOBACCO:  reports that she has never smoked. She does not have any smokeless tobacco history on file. ETOH:  reports no history of alcohol use. Family History:      Family history unknown: Yes       Review of Systems:  Pertinent positives are listed in HPI. At least 10-point ROS reviewed and were negative. Vitals and physical examination:  BP (!) 150/50   Pulse 86   Temp 97.1 °F (36.2 °C) (Oral)   Resp 19   Ht 5' 2\" (1.575 m)   Wt 98 lb (44.5 kg)   SpO2 92%   BMI 17.92 kg/m²   Gen/overall appearance: Not in acute distress. Alert. Oriented to place and person but not oriented to time. Head: Normocephalic, atraumatic  Eyes: EOMI, good acuity  ENT: Oral mucosa moist  Neck: No JVD, thyromegaly  CVS: Nml S1S2, no MRG, RRR  Pulm: Clear bilaterally. No crackles/wheezes  Gastrointestinal: Soft, NT/ND, +BS  Musculoskeletal: No edema. Warm  Neuro: No focal deficit. Moves extremity spontaneously. Psychiatry: Appropriate affect. Not agitated. Skin: Warm, dry with normal turgor. No rash  Capillary refill: Brisk,< 3 seconds   Peripheral Pulses: +2 palpable, equal bilaterally       Labs/imaging/EKG:  CBC:   Recent Labs     07/15/21  2354   WBC 7.6   HGB 12.0        BMP:    Recent Labs     07/15/21  2354   *   K 3.8      CO2 19*   BUN 23*   CREATININE 0.9   GLUCOSE 131*     Hepatic:   Recent Labs     07/15/21  2354   AST 27   ALT 14   BILITOT 0.4   ALKPHOS 53       XR ELBOW RIGHT (MIN 3 VIEWS)    Result Date: 7/15/2021  EXAMINATION: THREE XRAY VIEWS OF THE RIGHT ELBOW; ONE XRAY VIEW OF THE CHEST 7/15/2021 11:07 pm COMPARISON: None.  HISTORY: ORDERING SYSTEM PROVIDED HISTORY: Injury TECHNOLOGIST PROVIDED HISTORY: Reason for exam:->Injury Reason for Exam: Injury Acuity: Acute Type of Exam: Initial; ORDERING SYSTEM PROVIDED HISTORY: weakness TECHNOLOGIST PROVIDED HISTORY: Reason for exam:->weakness Reason for Exam: weakness Acuity: Unknown Type of Exam: Unknown ELBOW FINDINGS: No acute fracture. No dislocation. No effusion identified on lateral projection. Vascular calcifications noted. No acute fracture or dislocation. CHEST FINDINGS: Cardiac silhouette is within normal limits in size. Mediastinal contours are otherwise suboptimally evaluated due to rotated projection. Lungs demonstrate no focal consolidation or pleural effusion. No pneumothorax appreciated on this projection. IMPRESSION: No airspace disease by radiograph. CT Head WO Contrast    Result Date: 7/16/2021  EXAMINATION: CT OF THE HEAD WITHOUT CONTRAST  7/16/2021 12:23 am TECHNIQUE: CT of the head was performed without the administration of intravenous contrast. Dose modulation, iterative reconstruction, and/or weight based adjustment of the mA/kV was utilized to reduce the radiation dose to as low as reasonably achievable. COMPARISON: None. HISTORY: ORDERING SYSTEM PROVIDED HISTORY: fall, on blood thinners TECHNOLOGIST PROVIDED HISTORY: Reason for exam:->fall, on blood thinners Has a \"code stroke\" or \"stroke alert\" been called? ->No Decision Support Exception - unselect if not a suspected or confirmed emergency medical condition->Emergency Medical Condition (MA) Reason for Exam: fall, on blood thinners Acuity: Acute Type of Exam: Initial FINDINGS: BRAIN/VENTRICLES: There is no acute intracranial hemorrhage, mass effect or midline shift. No abnormal extra-axial fluid collection. The gray-white differentiation is maintained without evidence of an acute infarct. There is no evidence of hydrocephalus.  Marked ill-defined hypoattenuation is noted within cerebral white matter consistent with severe microvascular ischemic disease. Moderate diffuse decrease in cerebral volume is noted with corresponding prominence of the sulci and ventricles. ORBITS: The visualized portion of the orbits demonstrate no acute abnormality. SINUSES: The visualized paranasal sinuses and mastoid air cells demonstrate no acute abnormality. SOFT TISSUES/SKULL:  No acute abnormality of the visualized skull or soft tissues. 1. No acute intracranial abnormality. 2. Severe cerebral white matter chronic microvascular ischemic disease. 3. Moderate diffuse cerebral atrophy. CT Cervical Spine WO Contrast    Result Date: 7/16/2021  EXAMINATION: CT OF THE CERVICAL SPINE WITHOUT CONTRAST 7/16/2021 12:24 am TECHNIQUE: CT of the cervical spine was performed without the administration of intravenous contrast. Multiplanar reformatted images are provided for review. Dose modulation, iterative reconstruction, and/or weight based adjustment of the mA/kV was utilized to reduce the radiation dose to as low as reasonably achievable. COMPARISON: None. HISTORY: ORDERING SYSTEM PROVIDED HISTORY: fall TECHNOLOGIST PROVIDED HISTORY: Reason for exam:->fall Decision Support Exception - unselect if not a suspected or confirmed emergency medical condition->Emergency Medical Condition (MA) Reason for Exam: fall, on blood thinners Acuity: Acute Type of Exam: Initial FINDINGS: BONES/ALIGNMENT: There is no acute fracture or traumatic malalignment. DEGENERATIVE CHANGES: Multilevel mild-to-moderate spondylosis is noted within the cervical spine without significant central canal compromise/stenosis identified. C4/C5 moderate right and mild left, C5/C6 severe bilateral neural foraminal stenosis secondary to encroachment by disc osteophyte complex is identified. Multilevel mild-to-moderate facet degenerative hypertrophy is present. SOFT TISSUES: There is no prevertebral soft tissue swelling. No acute abnormality of the cervical spine.  C4/C5 moderate right and mild left, C5/C6 severe bilateral neural foraminal stenosis secondary to encroachment by disc osteophyte complex. CT ABDOMEN PELVIS W IV CONTRAST Additional Contrast? None    Result Date: 7/16/2021  EXAMINATION: CT OF THE ABDOMEN AND PELVIS WITH CONTRAST 7/16/2021 12:28 am TECHNIQUE: CT of the abdomen and pelvis was performed with the administration of intravenous contrast. Multiplanar reformatted images are provided for review. Dose modulation, iterative reconstruction, and/or weight based adjustment of the mA/kV was utilized to reduce the radiation dose to as low as reasonably achievable. COMPARISON: None. HISTORY: ORDERING SYSTEM PROVIDED HISTORY: n/v/d TECHNOLOGIST PROVIDED HISTORY: Additional Contrast?->None Reason for exam:->n/v/d Decision Support Exception - unselect if not a suspected or confirmed emergency medical condition->Emergency Medical Condition (MA) Reason for Exam: fall, on blood thinners Acuity: Acute Type of Exam: Initial FINDINGS: Abdomen/Pelvis: Lower chest: The lung bases are well aerated. Pleural surfaces are unremarkable and no evidence of pleural effusion is identified. Heart is moderately enlarged. Organs: Multifocal bilateral renal parenchymal simple cysts are seen. Concentric density ovoid gallstone is seen dependently within the gallbladder lumen which measures up to 22 mm in diameter. Splenic artery partially calcified aneurysms x2 are seen with the largest near the hilum of the spleen measuring up to 11 mm in diameter. The liver, gallbladder, spleen, pancreas, adrenal glands, kidneys, are otherwise unremarkable in appearance. GI/Bowel: Moderate-sized hiatal hernia is present. The stomach is unremarkable without wall thickening or distention. Numerous diverticula are noted scattered within the sigmoid colon without evidence of adjacent inflammatory change within the mesenteric fat identified.   Bowel loops are otherwise unremarkable in appearance without evidence of obstruction, distension or mucosal thickening. Pelvis: The urinary bladder is well distended and unremarkable in appearance. No evidence of pelvic free fluid is seen. Peritoneum/Retroperitoneum: No evidence of retroperitoneal or intraperitoneal lymphadenopathy is identified. No evidence of intraperitoneal free fluid is seen. Bones/Soft Tissues: Right hip total arthroplasty is seen with marked beam hardening artifact which limits visualization of the pelvis. L4/L5 severe right neural foraminal stenosis secondary to encroachment by disc osteophyte complex is seen. The bones, skeletal muscle bundles, fascial planes and subcutaneous soft tissues are otherwise unremarkable in appearance. 1. Cholelithiasis, as discussed above. 2. Moderate-sized hiatal hernia. 3. Marked sigmoid colon diverticulosis without evidence of diverticulitis. 4. Splenic artery small partially calcified aneurysms, as discussed above. 5. Multifocal bilateral renal parenchymal simple cysts. 6. L4/L5 severe right neural foraminal stenosis secondary to encroachment by disc osteophyte complex. 7. Moderate cardiomegaly. XR CHEST PORTABLE    Result Date: 7/15/2021  EXAMINATION: THREE XRAY VIEWS OF THE RIGHT ELBOW; ONE XRAY VIEW OF THE CHEST 7/15/2021 11:07 pm COMPARISON: None. HISTORY: ORDERING SYSTEM PROVIDED HISTORY: Injury TECHNOLOGIST PROVIDED HISTORY: Reason for exam:->Injury Reason for Exam: Injury Acuity: Acute Type of Exam: Initial; ORDERING SYSTEM PROVIDED HISTORY: weakness TECHNOLOGIST PROVIDED HISTORY: Reason for exam:->weakness Reason for Exam: weakness Acuity: Unknown Type of Exam: Unknown ELBOW FINDINGS: No acute fracture. No dislocation. No effusion identified on lateral projection. Vascular calcifications noted. No acute fracture or dislocation. CHEST FINDINGS: Cardiac silhouette is within normal limits in size. Mediastinal contours are otherwise suboptimally evaluated due to rotated projection.  Lungs demonstrate no focal consolidation or pleural effusion. No pneumothorax appreciated on this projection. IMPRESSION: No airspace disease by radiograph. EKG: Sinus rhythm, rate 88 beats per minutes. No acute ST/T changes. QTc 556. I reviewed EKG. Discussed with ER provider.       Thank you Pablito Huynh for the opportunity to be involved in this patient's care.    -----------------------------  Wilfrido Lagunas MD  [facility]

## 2021-07-16 NOTE — PLAN OF CARE
Problem: Falls - Risk of:  Goal: Will remain free from falls  Description: Will remain free from falls  7/16/2021 0734 by Shirley Perez, ADAM  Outcome: Ongoing  Note:       Impulsive, up to [facility] with standby assist and cane, gait weak and unsteady. High fall risk, precautions in place, call light in reach, video monitor at bedside. eliseo  7/16/2021 0733 by Shirley Perez RN  Outcome: Ongoing     Problem: Skin Integrity:  Goal: Will show no infection signs and symptoms  Description: Will show no infection signs and symptoms  Outcome: Ongoing  Note:     Bruising to upper and lower ext.   Skin tear to PATSY, steri strips and dry dressing in place.eliseo

## 2021-07-16 NOTE — PLAN OF CARE
Patient seen and examined. Denies any symptoms. Hard of hearing. Chart reviewed. Monitor overnight. PT OT consult.   If no further symptoms plan to discharge tomorrow

## 2021-07-16 NOTE — CONSULTS
Palliative Care:    a pleasant 80 y.o. female with history of atrial fibrillation, C. difficile infection, hyperlipidemia, essential hypertension, hypothyroidism, IBS, osteoporosis, osteoarthritis, who presents to the emergency room with complaints of generalized weakness, recurrent falls. Patient has had ongoing nausea, vomiting, diarrhea for nearly a week after eating pizza with pepperoni. She has now has decreased p.o. intake over the last few days. Somehow, occult stool was checked in the emergency room and was positive. CTabdomen/pelvis with no acute pathology; has multiple chronic findings. Admitted 7/16/21 and Palliative consult placed. Past Medical History:   has a past medical history of bladder, Clostridium difficile infection, Hypercholesterolaemia, Hypertension, Hypothyroidism, IBS (irritable bowel syndrome), Osteoarthritis, and Osteoporosis. Past Surgical History:   has a past surgical history that includes Bladder surgery. Advance Directives:   Full Code       Problem Severity: Pain/Other Symptoms:   acute gastroenteritis. Generalized weakness       Bed Mobility/Toileting/Transfer:   High fall risk. Frequent falls at home. Performance Status:        Palliative Performance Scale:  100% []Full Normal activity & work No evidence of disease  90%   [] Full Normal activity & work Some evidence of disease  80%   [] Full Normal activity with Effort Some evidence of disease  70%   [] Reduced Unable Normal Job/Work Significant disease Full Normal or reduced  60%   [] Ambulation reduced; Significant disease; Can't do hobbies/housework; intake normal   or reduced; occasional assist; LOC full/confusion  50%   [x] Mainly sit/lie; Extensive disease; Can't do any work; Considerable assist; intake normal  Or reduced; LOC full/confusion  40%   [] Mainly in bed; Extensive disease; Mainly assist; intake normal or reduced; occasional assist; LOC full/confusion  30%   [] Bed Bound;  Extensive disease; Total care; intake reduced; LOC full/confusion  20%   [] Bed Bound; Extensive disease; Total care; intake minimal; Drowsy/coma  10%   [] Bed Bound; Extensive disease; Total care; Mouth care only; Drowsy/coma    PPS 50%  Symptom Assessment: Appetite/Nausea/Bowels/Fatigue:    lbs. Albumin    3.1    Social History:   reports that she has never smoked. She does not have any smokeless tobacco history on file. She reports that she does not drink alcohol and does not use drugs. Family History:  Family history is unknown by patient. Psychological/Spiritual:   . Five children. Resides in own home. High fall risk/impulsive. Non-Mandaen.          Family Discussion:   Patient in bed. Denies pain. Unable to follow safety request to use call light when OOB. Supportive  at bedside. Discussed ACP/Code status. ACP completed and not in EMR. Requested  to bring in and have scanned. States he will. Patient wishes are full code.  able to verbalize understanding of all variables. Discussed [place]. Denies offer for spiritual support at this time.  does state patient has frequent falls at home. Discussed options of use of walker. Patient refuses. Only wants to use her cane. Offered additional support such as Palliative, skilled therapies, etc.  states patient does not want \"strangers\" coming in. Palliative will continue to follow as needed.  appears exhausted. Nurse Chapis Brurell updated of these conversations.

## 2021-07-16 NOTE — ED PROVIDER NOTES
[address] [facility]        Pt Name: Wallace Mcgarry  MRN: [id number]  [place] 12/6/1929  Date of evaluation: 7/15/2021  Provider: Juliana David PA-C  PCP: Court Roberto  Note Started: 10:49 PM EDT       JOSE. I have evaluated this patient. My supervising physician was available for consultation. CHIEF COMPLAINT       No chief complaint on file. HISTORY OF PRESENT ILLNESS   (Location, Timing/Onset, Context/Setting, Quality, Duration, Modifying Factors, Severity, Associated Signs and Symptoms)  Note limiting factors. Chief Complaint: n/v/d, weakness, fall    Wallace Mcgarry is a 80 y.o. female who presents to the emergency department for evaluation of generalized weakness, nausea, vomiting, diarrhea as well as fall today. Patient ate a pepperoni pizza and developed some abdominal pain, nausea and vomiting followed by diarrhea.  did not eat the same pizza and has not had similar symptoms. Patient states she is currently not feeling nauseated does not have any abdominal pain and has not had any diarrhea since arriving here in the emergency department. However family member state this has been ongoing for the past few days.  who is at bedside states she is so weak due to the diarrhea that she is unable to walk. She has had 3 falls at home today. 1 has resulted in a large skin tear to her right elbow. Patient has associated pain with movement of this elbow. Unsure if the patient hit her head. No loss of consciousness. She is on a blood thinner due to atrial fibrillation. Patient was seen at [facility] earlier today, discharged home with Zofran.  states he is unable to care for the patient at home with her being so weak and he is afraid that she will continue to fall. He states that she has continued to have nonstop diarrhea at home. He states that she is not eating or drinking much by mouth.   Denies any fevers. Nursing Notes were all reviewed and agreed with or any disagreements were addressed in the HPI. REVIEW OF SYSTEMS    (2-9 systems for level 4, 10 or more for level 5)     Review of Systems   Constitutional: Negative for fatigue and fever. HENT: Negative. Eyes: Negative for visual disturbance. Respiratory: Negative for shortness of breath. Cardiovascular: Negative for chest pain. Gastrointestinal: Positive for diarrhea, nausea and vomiting. Negative for abdominal pain. Genitourinary: Negative. Skin: Positive for wound. Neurological: Positive for weakness. Positives and Pertinent negatives as per HPI. Except as noted above in the ROS, all other systems were reviewed and negative. PAST MEDICAL HISTORY     Past Medical History:   Diagnosis Date    bladder     Clostridium difficile infection     Hypercholesterolaemia     Pt was on medication for high cholesterol but was taken off by doctor    Hypertension     Hypothyroidism     IBS (irritable bowel syndrome)     Osteoarthritis     Osteoporosis          SURGICAL HISTORY     Past Surgical History:   Procedure Laterality Date    BLADDER SURGERY      For removal of cancerous tumors 1990; no recurrence since         CURRENTMEDICATIONS       Previous Medications    ACETAMINOPHEN (TYLENOL) 500 MG TABLET    Take 500 mg by mouth every 6 hours as needed    ALENDRONATE (FOSAMAX) 70 MG TABLET    alendronate 70 mg tablet    AMIODARONE (CORDARONE) 200 MG TABLET    amiodarone 200 mg tablet    AMLODIPINE (NORVASC) 10 MG TABLET    amlodipine 10 mg tablet    DONEPEZIL (ARICEPT) 5 MG TABLET    Take 5 mg by mouth nightly    LEVOTHYROXINE (SYNTHROID) 100 MCG TABLET    levothyroxine 100 mcg tablet    LISINOPRIL (PRINIVIL;ZESTRIL) 40 MG TABLET    lisinopril 40 mg tablet    MULTIPLE VITAMINS-CALCIUM (ONE-A-DAY WOMENS PO)    Take  by mouth. NIACIN 500 MG CR CAPSULE    Take 500 mg by mouth nightly.  2 tabs daily     NITROFURANTOIN, MACROCRYSTAL-MONOHYDRATE, (MACROBID) 100 MG CAPSULE    nitrofurantoin monohydrate/macrocrystals 100 mg capsule    OMEPRAZOLE (PRILOSEC) 20 MG DELAYED RELEASE CAPSULE    omeprazole 20 mg capsule,delayed release    ONDANSETRON (ZOFRAN-ODT) 4 MG DISINTEGRATING TABLET    Take 4 mg by mouth every 6 hours as needed    PSYLLIUM (METAMUCIL PO)    Take  by mouth. 1 teaspoon in water at varying times of the day as needed     WARFARIN (COUMADIN) 2.5 MG TABLET    Coumadin 2.5 mg tablet         ALLERGIES     Duloxetine, Sertraline, and Sulfa antibiotics    FAMILYHISTORY       Family History   Family history unknown: Yes          SOCIAL HISTORY       Social History     Tobacco Use    Smoking status: Never Smoker   Substance Use Topics    Alcohol use: No    Drug use: No       SCREENINGS             PHYSICAL EXAM    (up to 7 for level 4, 8 or more for level 5)     ED Triage Vitals [07/15/21 2210]   BP Temp Temp Source Pulse Resp SpO2 Height Weight   (!) 154/68 97.1 °F (36.2 °C) Oral 89 14 -- 5' 2\" (1.575 m) 98 lb (44.5 kg)       Physical Exam  Vitals and nursing note reviewed. Constitutional:       General: She is not in acute distress. Appearance: Normal appearance. She is well-developed. She is not ill-appearing, toxic-appearing or diaphoretic. HENT:      Head: Normocephalic and atraumatic. Right Ear: Tympanic membrane, ear canal and external ear normal.      Left Ear: Tympanic membrane, ear canal and external ear normal.      Nose: Nose normal.      Mouth/Throat:      Mouth: Mucous membranes are moist.      Pharynx: Oropharynx is clear. Eyes:      General:         Right eye: No discharge. Left eye: No discharge. Conjunctiva/sclera: Conjunctivae normal.      Pupils: Pupils are equal, round, and reactive to light. Cardiovascular:      Rate and Rhythm: Normal rate and regular rhythm. Pulses: Normal pulses. Heart sounds: No murmur heard. No gallop.     Pulmonary:      Effort: Pulmonary Lipase 12.0 (*)     All other components within normal limits    Narrative:     Performed at:  [facility] [address], [place]   Phone 21  - Abnormal; Notable for the following components:    Pro- (*)     All other components within normal limits    Narrative:     Performed at:  [facility] [address], [place]   Phone [phone]   URINE RT REFLEX TO CULTURE - Abnormal; Notable for the following components:    Clarity, UA CLOUDY (*)     Blood, Urine SMALL (*)     Protein, UA 30 (*)     Leukocyte Esterase, Urine TRACE (*)     All other components within normal limits    Narrative:     Performed at:  [facility] [address], [place]   Phone [phone]   BLOOD OCCULT STOOL DIAGNOSTIC - Abnormal; Notable for the following components:    Occult Blood Diagnostic   (*)     Value: Result: POSITIVE  Normal range: Negative      All other components within normal limits    Narrative:     ORDER#: [id number]                          ORDERED BY: Roselyn Fajardo  SOURCE: Stool                              COLLECTED:  07/16/21 02:46  ANTIBIOTICS AT KANDI.:                      RECEIVED :  07/16/21 02:46  Performed at:  [facility] [address], [place]   Phone [phone]   PROTIME-INR - Abnormal; Notable for the following components:    Protime 22.9 (*)     INR 1.97 (*)     All other components within normal limits    Narrative:     Performed at:  [facility] [address], [place]   Phone [phone]   C DIFF TOXIN/ANTIGEN   GASTROINTESTINAL PANEL, MOLECULAR   TROPONIN    Narrative:     Performed at:  [facility] [address], [place]   Phone [phone]   Antionette Mcnulty Narrative:     Performed at:  [facility]  [address], [address]   Phone [phone]   [facility]       When ordered only abnormal lab results are displayed. All other labs were within normal range or not returned as of this dictation. EKG: When ordered, EKG's are interpreted by the Emergency Department Physician in the absence of a cardiologist.  Please see their note for interpretation of EKG. RADIOLOGY:   Non-plain film images such as CT, Ultrasound and MRI are read by the radiologist. Plain radiographic images are visualized and preliminarily interpreted by the ED Provider with the below findings:        Interpretation per the Radiologist below, if available at the time of this note:    CT ABDOMEN PELVIS W IV CONTRAST Additional Contrast? None   Final Result   1. Cholelithiasis, as discussed above. 2. Moderate-sized hiatal hernia. 3. Marked sigmoid colon diverticulosis without evidence of diverticulitis. 4. Splenic artery small partially calcified aneurysms, as discussed above. 5. Multifocal bilateral renal parenchymal simple cysts. 6. L4/L5 severe right neural foraminal stenosis secondary to encroachment by   disc osteophyte complex. 7. Moderate cardiomegaly. CT Cervical Spine WO Contrast   Final Result   No acute abnormality of the cervical spine. C4/C5 moderate right and mild left, C5/C6 severe bilateral neural foraminal   stenosis secondary to encroachment by disc osteophyte complex. CT Head WO Contrast   Final Result   1. No acute intracranial abnormality. 2. Severe cerebral white matter chronic microvascular ischemic disease. 3. Moderate diffuse cerebral atrophy. XR CHEST PORTABLE   Final Result   No acute fracture or dislocation. CHEST FINDINGS:   Cardiac silhouette is within normal limits in size.  Mediastinal contours are   otherwise suboptimally evaluated due to rotated projection. Lungs demonstrate   no focal consolidation or pleural effusion. No pneumothorax appreciated on   this projection. IMPRESSION:   No airspace disease by radiograph. XR ELBOW RIGHT (MIN 3 VIEWS)   Final Result   No acute fracture or dislocation. CHEST FINDINGS:   Cardiac silhouette is within normal limits in size. Mediastinal contours are   otherwise suboptimally evaluated due to rotated projection. Lungs demonstrate   no focal consolidation or pleural effusion. No pneumothorax appreciated on   this projection. IMPRESSION:   No airspace disease by radiograph. No results found. PROCEDURES   Unless otherwise noted below, none     Lac Repair    Date/Time: 7/16/2021 3:45 AM  Performed by: Mahendra Ramon PA-C  Authorized by: Mahendra Ramon PA-C     Laceration details:     Location:  Shoulder/arm    Shoulder/arm location:  R elbow    Length (cm):  4  Repair type:     Repair type:  Simple  Treatment:     Area cleansed with:  Hibiclens and saline    Amount of cleaning:  Extensive    Irrigation solution:  Sterile saline  Skin repair:     Repair method:  Steri-Strips    Number of Steri-Strips:  4  Approximation:     Approximation:  Close  Post-procedure details:     Dressing:  Open (no dressing)    Patient tolerance of procedure: Tolerated well, no immediate complications  Lac Repair    Date/Time: 7/16/2021 3:46 AM  Performed by: Mahendra Ramon PA-C  Authorized by: Mahendra Ramon PA-C     Laceration details:     Location:  Shoulder/arm    Shoulder/arm location:  R elbow    Length (cm):  4.5  Treatment:     Area cleansed with:  Saline and Hibiclens    Amount of cleaning:  Standard    Irrigation solution:  Sterile saline  Skin repair:     Repair method:  Steri-Strips    Number of Steri-Strips:  4  Approximation:     Approximation:  Close  Post-procedure details:     Dressing:  Open (no dressing)    Patient tolerance of procedure:   Tolerated well, no immediate complications        CRITICAL CARE TIME   N/A    CONSULTS:  IP CONSULT TO GI  IP CONSULT TO GI  IP CONSULT TO PALLIATIVE CARE      EMERGENCY DEPARTMENT COURSE and DIFFERENTIAL DIAGNOSIS/MDM:   Vitals:    Vitals:    07/16/21 0045 07/16/21 0100 07/16/21 0115 07/16/21 0130   BP:       Pulse: 89 89 84 86   Resp: 14 19 22 19   Temp:       TempSrc:       SpO2:    92%   Weight:       Height:           Patient was given the following medications:  Medications   topical skin adhesive stick (has no administration in time range)   pantoprazole (PROTONIX) injection 40 mg (has no administration in time range)   lactated ringers infusion (has no administration in time range)   0.9 % sodium chloride bolus (1,000 mLs Intravenous New Bag 7/16/21 0130)   iopamidol (ISOVUE-370) 76 % injection 75 mL (75 mLs Intravenous Given 7/16/21 0040)           Patient presents emergency department for evaluation of generalized weakness secondary to diarrheal illness. Patient was seen at [facility] earlier today, sent home with Zofran. Patient has had multiple falls at home today secondary to weakness. She is not eating for the past 3 days. Patient appears dehydrated on examination with decreased skin turgor and she was started on fluids. Patient has occult positive stool. Abdomen is soft nontender without rebound or guarding. Bowel sounds normal bilaterally. Heart rate is regular without murmurs rubs or gallops. Lungs clear auscultation bilaterally. EKG shows no acute ST changes. Laboratory evaluation shows decreased calcium, protein and albumin. CT shows only chronic findings in no acute changes at this time. Patient denies any nausea at this time. Patient does not have any diarrhea while here in the emergency department although I did order C. difficile and stool pathogen testing. Patient is given Protonix as well as her fluids due to the occult positive stool.   Patient was very weak when moving from the bed to a bedside commode. Patient will be admitted to the hospitalist for further management of her generalized weakness, anorexia and diarrheal illness. Patient and her  are amenable to inpatient plan. Hospitalist Bernabe accepts the patient for admission. Patient had 2 skin tears to her right elbow that were repaired by myself, see procedure note for further information. FINAL IMPRESSION      1. Nausea vomiting and diarrhea    2. General weakness    3. Frequent falls    4. Skin tear of elbow without complication, right, initial encounter    5. Occult blood positive stool          DISPOSITION/PLAN   DISPOSITION Admitted 07/16/2021 04:23:47 AM      PATIENT REFERRED TO:  No follow-up provider specified. DISCHARGE MEDICATIONS:  New Prescriptions    No medications on file       DISCONTINUED MEDICATIONS:  Discontinued Medications    ASPIRIN 81 MG EC TABLET    Take 81 mg by mouth daily. ATENOLOL (TENORMIN) 50 MG TABLET    Take 100 mg by mouth daily. CALCIUM CITRATE-VITAMIN D 200-200 MG-UNIT TABS    Take  by mouth daily. CEPHALEXIN (KEFLEX) 500 MG CAPSULE    cephalexin 500 mg capsule    FLUTICASONE (FLONASE) 50 MCG/ACT NASAL SPRAY    fluticasone propionate 50 mcg/actuation nasal spray,suspension    GABAPENTIN (NEURONTIN) 600 MG TABLET    gabapentin 600 mg tablet    HYDRALAZINE (APRESOLINE) 25 MG TABLET    hydralazine 25 mg tablet    HYDROCHLOROTHIAZIDE (HYDRODIURIL) 25 MG TABLET    Take 25 mg by mouth daily. LEVOTHYROXINE (SYNTHROID) 25 MCG TABLET    Take 75 mcg by mouth daily. LISINOPRIL (PRINIVIL) 5 MG TABLET    Take 1 tablet by mouth daily. TRAMADOL (ULTRAM) 50 MG TABLET    Take 50 mg by mouth every 6 hours as needed.                 (Please note that portions of this note were completed with a voice recognition program.  Efforts were made to edit the dictations but occasionally words are mis-transcribed.)    Bella Cheng PA-C (electronically signed)            Bella Cheng PA-C  07/16/21

## 2021-07-16 NOTE — CARE COORDINATION
Met with patient and her spouse, and Patient denies any needs at this time. Patient informed to inform case management if any needs arise. Case management will continue to follow and update discharge plan as needed.

## 2021-07-16 NOTE — PROGRESS NOTES
Admitted from ED, arrived in room around [phone], spouse accompanied pt to room. Pt A/O self and place, forgetful. Denies having any pain, no nausea. Oriented to room, call light in reach. Plan of care reviewed with pt and spouse, questions answered, VU. Temp 100.5 (O), gave Tylenol per prn order. Assessment completed, see flow charts. No distress noted. eliseo

## 2021-07-16 NOTE — CONSULTS
Gastroenterology Consult Note        Patient: Katerina Pierce  : 1929  Acct#:      Date:  2021    Subjective:       History of Present Illness  Patient is a 80 y.o.  female admitted with Acute gastroenteritis [K52.9] who is seen in consult for guaiac positive stool. She was brought to the ED at [facility] for one week h/o diarrhea, N/V, and abdominal pain. She was d/c home. She was very weak and family unable to care for her at home so was brought to the ED here and admitted. Per RN, no BMs or diarrhea here. reliability of her ability to give history is questionable. She does not recall diarrhea or vomiting. She denies abdominal pain or nausea to me. Pt not aware of any melena or hematochezia. Past Medical History:   Diagnosis Date    bladder     Clostridium difficile infection     Hypercholesterolaemia     Pt was on medication for high cholesterol but was taken off by doctor    Hypertension     Hypothyroidism     IBS (irritable bowel syndrome)     Osteoarthritis     Osteoporosis       Past Surgical History:   Procedure Laterality Date    BLADDER SURGERY      For removal of cancerous tumors ; no recurrence since      Past Endoscopic History    Admission Meds  No current facility-administered medications on file prior to encounter.      Current Outpatient Medications on File Prior to Encounter   Medication Sig Dispense Refill    donepezil (ARICEPT) 5 MG tablet Take 5 mg by mouth nightly      acetaminophen (TYLENOL) 500 MG tablet Take 500 mg by mouth every 6 hours as needed      alendronate (FOSAMAX) 70 MG tablet alendronate 70 mg tablet      amiodarone (CORDARONE) 200 MG tablet amiodarone 200 mg tablet      amLODIPine (NORVASC) 10 MG tablet amlodipine 10 mg tablet      omeprazole (PRILOSEC) 20 MG delayed release capsule omeprazole 20 mg capsule,delayed release      ondansetron (ZOFRAN-ODT) 4 MG disintegrating tablet Take 4 mg by mouth every 6 hours as needed      warfarin (COUMADIN) 2.5 MG tablet Coumadin 2.5 mg tablet      levothyroxine (SYNTHROID) 100 MCG tablet levothyroxine 100 mcg tablet      lisinopril (PRINIVIL;ZESTRIL) 40 MG tablet lisinopril 40 mg tablet      Psyllium (METAMUCIL PO) Take  by mouth. 1 teaspoon in water at varying times of the day as needed       nitrofurantoin, macrocrystal-monohydrate, (MACROBID) 100 MG capsule nitrofurantoin monohydrate/macrocrystals 100 mg capsule      niacin 500 MG CR capsule Take 500 mg by mouth nightly. 2 tabs daily       Multiple Vitamins-Calcium (ONE-A-DAY WOMENS PO) Take  by mouth. Allergies  Allergies   Allergen Reactions    Duloxetine      Felt weird    Sertraline Nausea Only    Sulfa Antibiotics Nausea And Vomiting      Social   Social History     Tobacco Use    Smoking status: Never Smoker   Substance Use Topics    Alcohol use: No        Family History   Family history unknown: Yes           Review of Systems  Constitutional: negative for fevers, chills, sweats    Ears, nose, mouth, throat, and face: negative for nasal congestion and sore throat   Respiratory: negative for cough and shortness of breath   Cardiovascular: negative for chest pain and dyspnea   Gastrointestinal: see hpi   Genitourinary:negative for dysuria and frequency   Integument/breast: negative for pruritus and rash   Hematologic/lymphatic: negative for bleeding and easy bruising   Musculoskeletal:negative for arthralgias and myalgias   Neurological: negative for dizziness and weakness         Physical Exam  Blood pressure (!) 161/71, pulse 88, temperature 100.5 °F (38.1 °C), temperature source Oral, resp. rate 16, height 5' 2\" (1.575 m), weight 102 lb 8 oz (46.5 kg), SpO2 91 %.     General appearance: alert, cooperative, no distress, appears stated age  Eyes: Anicteric  Head: Normocephalic, without obvious abnormality  Lungs: clear to auscultation bilaterally, Normal Effort  Heart: regular rate and rhythm, normal S1 and S2, no murmurs or rubs  Abdomen: soft, non-tender. Bowel sounds normal. No masses,  no organomegaly. Extremities: atraumatic, no cyanosis or edema  Skin: warm and dry, no jaundice  Neuro: Grossly intact, alert and oriented to hospital only  Musculoskeletal: 5/5  strength BUE      Data Review:    Recent Labs     07/15/21  2354   WBC 7.6   HGB 12.0   HCT 36.8   MCV 98.9        Recent Labs     07/15/21  2354   *   K 3.8      CO2 19*   BUN 23*   CREATININE 0.9     Recent Labs     07/15/21  2354   AST 27   ALT 14   BILITOT 0.4   ALKPHOS 53     Recent Labs     07/15/21  2354   LIPASE 12.0*     Recent Labs     07/15/21  2355   PROTIME 22.9*   INR 1.97*     No results for input(s): PTT in the last 72 hours. No results for input(s): OCCULTBLD in the last 72 hours. Imaging Studies:                           CT-scan of abdomen and pelvis w iv contrast 7/16/21:  Impression   1. Cholelithiasis, as discussed above. 2. Moderate-sized hiatal hernia. 3. Marked sigmoid colon diverticulosis without evidence of diverticulitis. 4. Splenic artery small partially calcified aneurysms, as discussed above. 5. Multifocal bilateral renal parenchymal simple cysts. 6. L4/L5 severe right neural foraminal stenosis secondary to encroachment by   disc osteophyte complex. 7. Moderate cardiomegaly.                        Assessment:     Active Problems:    Acute gastroenteritis  Resolved Problems:    * No resolved hospital problems. *    N/V/D, abdominal pain - x1 week. Seems to be resolving. CT with gallstones but no cholecystitis. Suspect symptoms were from a gastroenteritis. hgb 12. Recommendations:   - clear liquids and advance diet as tolerated  -Check stool for C.diff, GI bacterial pathogens PCR, and EIA for parasites if has further diarrhea    Discussed with Dr. Maurice Do, PA-C  [facility]  I have personally performed a face to face diagnostic evaluation on this patient. I have interviewed and examined the patient and I agree with the findings and recommended plan of care. In summary, my findings and plan are the following:  As above, elderly woman admitted with typical symptoms of gastroenteritis with nausea, vomiting, diarrhea, and vague abdominal discomfort. She was found to have occult blood in the stool. She has no melena or hematochezia and on her exam her abdomen is soft and nontender. She is tolerating clear liquids although does not like them. We will advance her diet and observe. We await results of her stool testing for enteric pathogen's. I do not believe further endoscopic evaluation is necessary unless her symptoms persist beyond 2 weeks. Treatment at this point is supportive as you were doing. We will sign off for now. Please call with any questions or if her symptoms fail to resolve.     Kathryn Luna, [address] [place] [address]  7/16/2021

## 2021-07-17 LAB
A/G RATIO: 1.2 (ref 1.1–2.2)
ALBUMIN SERPL-MCNC: 2.9 G/DL (ref 3.4–5)
ALP BLD-CCNC: 46 U/L (ref 40–129)
ALT SERPL-CCNC: 15 U/L (ref 10–40)
ANION GAP SERPL CALCULATED.3IONS-SCNC: 9 MMOL/L (ref 3–16)
AST SERPL-CCNC: 29 U/L (ref 15–37)
BASOPHILS ABSOLUTE: 0 K/UL (ref 0–0.2)
BASOPHILS RELATIVE PERCENT: 0.3 %
BILIRUB SERPL-MCNC: 0.4 MG/DL (ref 0–1)
BUN BLDV-MCNC: 12 MG/DL (ref 7–20)
CALCIUM SERPL-MCNC: 7.2 MG/DL (ref 8.3–10.6)
CHLORIDE BLD-SCNC: 105 MMOL/L (ref 99–110)
CO2: 20 MMOL/L (ref 21–32)
CREAT SERPL-MCNC: 0.7 MG/DL (ref 0.6–1.2)
EOSINOPHILS ABSOLUTE: 0 K/UL (ref 0–0.6)
EOSINOPHILS RELATIVE PERCENT: 0.1 %
GFR AFRICAN AMERICAN: >60
GFR NON-AFRICAN AMERICAN: >60
GI BACTERIAL PATHOGENS BY PCR: ABNORMAL
GI BACTERIAL PATHOGENS BY PCR: ABNORMAL
GLOBULIN: 2.5 G/DL
GLUCOSE BLD-MCNC: 106 MG/DL (ref 70–99)
HCT VFR BLD CALC: 36.3 % (ref 36–48)
HEMOGLOBIN: 12.1 G/DL (ref 12–16)
INR BLD: 2.27 (ref 0.88–1.12)
LYMPHOCYTES ABSOLUTE: 0.5 K/UL (ref 1–5.1)
LYMPHOCYTES RELATIVE PERCENT: 9 %
MAGNESIUM: 1.6 MG/DL (ref 1.8–2.4)
MCH RBC QN AUTO: 32.5 PG (ref 26–34)
MCHC RBC AUTO-ENTMCNC: 33.4 G/DL (ref 31–36)
MCV RBC AUTO: 97.3 FL (ref 80–100)
MONOCYTES ABSOLUTE: 0.8 K/UL (ref 0–1.3)
MONOCYTES RELATIVE PERCENT: 13.6 %
NEUTROPHILS ABSOLUTE: 4.6 K/UL (ref 1.7–7.7)
NEUTROPHILS RELATIVE PERCENT: 77 %
ORGANISM: ABNORMAL
PDW BLD-RTO: 14 % (ref 12.4–15.4)
PLATELET # BLD: 250 K/UL (ref 135–450)
PMV BLD AUTO: 6.9 FL (ref 5–10.5)
POTASSIUM REFLEX MAGNESIUM: 3.4 MMOL/L (ref 3.5–5.1)
PROTHROMBIN TIME: 26.5 SEC (ref 9.9–12.7)
RBC # BLD: 3.72 M/UL (ref 4–5.2)
SODIUM BLD-SCNC: 134 MMOL/L (ref 136–145)
TOTAL PROTEIN: 5.4 G/DL (ref 6.4–8.2)
WBC # BLD: 5.9 K/UL (ref 4–11)

## 2021-07-17 PROCEDURE — 85025 COMPLETE CBC W/AUTO DIFF WBC: CPT

## 2021-07-17 PROCEDURE — 96365 THER/PROPH/DIAG IV INF INIT: CPT

## 2021-07-17 PROCEDURE — 85610 PROTHROMBIN TIME: CPT

## 2021-07-17 PROCEDURE — 51702 INSERT TEMP BLADDER CATH: CPT

## 2021-07-17 PROCEDURE — 6370000000 HC RX 637 (ALT 250 FOR IP): Performed by: INTERNAL MEDICINE

## 2021-07-17 PROCEDURE — G0378 HOSPITAL OBSERVATION PER HR: HCPCS

## 2021-07-17 PROCEDURE — 96366 THER/PROPH/DIAG IV INF ADDON: CPT

## 2021-07-17 PROCEDURE — 83735 ASSAY OF MAGNESIUM: CPT

## 2021-07-17 PROCEDURE — 6360000002 HC RX W HCPCS: Performed by: INTERNAL MEDICINE

## 2021-07-17 PROCEDURE — 2580000003 HC RX 258: Performed by: INTERNAL MEDICINE

## 2021-07-17 PROCEDURE — 36415 COLL VENOUS BLD VENIPUNCTURE: CPT

## 2021-07-17 PROCEDURE — C9113 INJ PANTOPRAZOLE SODIUM, VIA: HCPCS | Performed by: INTERNAL MEDICINE

## 2021-07-17 PROCEDURE — 51798 US URINE CAPACITY MEASURE: CPT

## 2021-07-17 PROCEDURE — 80053 COMPREHEN METABOLIC PANEL: CPT

## 2021-07-17 RX ORDER — MAGNESIUM SULFATE IN WATER 40 MG/ML
2000 INJECTION, SOLUTION INTRAVENOUS ONCE
Status: COMPLETED | OUTPATIENT
Start: 2021-07-17 | End: 2021-07-17

## 2021-07-17 RX ORDER — DOXYCYCLINE HYCLATE 100 MG
100 TABLET ORAL EVERY 12 HOURS SCHEDULED
Status: DISCONTINUED | OUTPATIENT
Start: 2021-07-17 | End: 2021-07-18 | Stop reason: HOSPADM

## 2021-07-17 RX ORDER — POTASSIUM CHLORIDE 20 MEQ/1
40 TABLET, EXTENDED RELEASE ORAL ONCE
Status: COMPLETED | OUTPATIENT
Start: 2021-07-17 | End: 2021-07-17

## 2021-07-17 RX ADMIN — WARFARIN SODIUM 2 MG: 2 TABLET ORAL at 17:57

## 2021-07-17 RX ADMIN — POTASSIUM CHLORIDE 40 MEQ: 20 TABLET, EXTENDED RELEASE ORAL at 09:35

## 2021-07-17 RX ADMIN — Medication 10 ML: at 20:40

## 2021-07-17 RX ADMIN — DOXYCYCLINE HYCLATE 100 MG: 100 TABLET, COATED ORAL at 06:06

## 2021-07-17 RX ADMIN — METOPROLOL SUCCINATE 25 MG: 25 TABLET, EXTENDED RELEASE ORAL at 09:35

## 2021-07-17 RX ADMIN — HYDRALAZINE HYDROCHLORIDE 75 MG: 25 TABLET, FILM COATED ORAL at 20:40

## 2021-07-17 RX ADMIN — LISINOPRIL 40 MG: 20 TABLET ORAL at 09:35

## 2021-07-17 RX ADMIN — DONEPEZIL HYDROCHLORIDE 5 MG: 5 TABLET, FILM COATED ORAL at 20:40

## 2021-07-17 RX ADMIN — DOXYCYCLINE HYCLATE 100 MG: 100 TABLET, COATED ORAL at 20:40

## 2021-07-17 RX ADMIN — HYDRALAZINE HYDROCHLORIDE 75 MG: 25 TABLET, FILM COATED ORAL at 09:35

## 2021-07-17 RX ADMIN — AMLODIPINE BESYLATE 10 MG: 5 TABLET ORAL at 09:35

## 2021-07-17 RX ADMIN — PREDNISONE 5 MG: 5 TABLET ORAL at 09:41

## 2021-07-17 RX ADMIN — Medication 10 ML: at 09:35

## 2021-07-17 RX ADMIN — LEVOTHYROXINE SODIUM 100 MCG: 0.1 TABLET ORAL at 06:06

## 2021-07-17 RX ADMIN — PANTOPRAZOLE SODIUM 40 MG: 40 INJECTION, POWDER, FOR SOLUTION INTRAVENOUS at 09:35

## 2021-07-17 RX ADMIN — MAGNESIUM SULFATE HEPTAHYDRATE 2000 MG: 40 INJECTION, SOLUTION INTRAVENOUS at 11:31

## 2021-07-17 ASSESSMENT — PAIN SCALES - GENERAL: PAINLEVEL_OUTOF10: 2

## 2021-07-17 ASSESSMENT — PAIN DESCRIPTION - PAIN TYPE: TYPE: ACUTE PAIN

## 2021-07-17 ASSESSMENT — PAIN DESCRIPTION - LOCATION: LOCATION: HEAD

## 2021-07-17 NOTE — PLAN OF CARE
Problem: Falls - Risk of:  Goal: Will remain free from falls  Description: Will remain free from falls  Note: Patient free from harm. ID bands on, bed in lowest position, call light in reach. Patient instructed to call for help if needed. Patient educated on ambulation and safety. Problem: Musculor/Skeletal Functional Status  Goal: Highest potential functional level  Note: Pt able to ambulate to BR with use of cane and SBA.

## 2021-07-17 NOTE — CONSULTS
[facility] Consult Note  Inpatient Setting - [facility]    Provider: Jena Roman MD MD Patient ID:  Admission Date: 7/15/2021 Name: Travis Cruz Date: n/a MRN: [id number]   Patient Location: [id number] : 1929  Attending: Gloria Wagner MD Date of Service: 2021  PCP: Rao Wheeler     Diagnoses:  1. Nausea vomiting and diarrhea    2. General weakness    3. Frequent falls    4. Skin tear of elbow without complication, right, initial encounter    5. Occult blood positive stool      Urinary retention    Assessment/Plan:  81 yo F admitted with Campylobacter GI infection and loose BM, also found to be in AUR with ~1 L in bladder when raphael placed. CT with moderate bladder distention. Normal Scr and no hydro. She denies any prior retention of bladder related issues. - continue raphael for now  - VT prior to dc and if needed would start CIC after. If planning dc tomorrow ok with raphael removal in AM  - consider urecholine however SE of diarrhea and abdominal cramping so will hold for now       All the patients questions were answered in detail. She understands the plan as listed above. · Review/order of labs  · Review/order of radiology studies  Total time spent face-to-face with the patient 10 min, including greater than 50% of this time in discussion with the patient/family concerning the following:  · Recommended tests  · management options  · risks/benefits of management options  · importance of compliance  · Prognosis  · Risk factor reduction  · Patient/family education                                                                                                                                                      CC: No chief complaint on file. HPI: Hammad Blair is a 80 y.o. female.   I saw the patient today for urinary retention      Past Medical History:   She has a past medical history of bladder, Clostridium difficile infection, Hypercholesterolaemia, Hypertension, Hypothyroidism, IBS (irritable bowel syndrome), Osteoarthritis, and Osteoporosis. Past Surgical History:  She has a past surgical history that includes Bladder surgery. Allergies: Allergies   Allergen Reactions    Duloxetine      Felt weird    Sertraline Nausea Only    Sulfa Antibiotics Nausea And Vomiting       Social History:  She reports that she has never smoked. She does not have any smokeless tobacco history on file. She reports that she does not drink alcohol and does not use drugs. Family History:  Family history is unknown by patient. Medications:   Scheduled Meds:   doxycycline hyclate  100 mg Oral 2 times per day    pantoprazole  40 mg Intravenous Daily    sodium chloride flush  5-40 mL Intravenous 2 times per day    amLODIPine  10 mg Oral Daily    donepezil  5 mg Oral Nightly    levothyroxine  100 mcg Oral Daily    lisinopril  40 mg Oral Daily    metoprolol succinate  25 mg Oral Daily    warfarin  2 mg Oral Daily    hydrALAZINE  75 mg Oral BID    predniSONE  5 mg Oral Daily with breakfast     Continuous Infusions:   sodium chloride       PRN Meds:sodium chloride flush, sodium chloride, polyethylene glycol, acetaminophen **OR** acetaminophen, meclizine    Review of Systems:  Constitutional: Negative for fever    Genitourinary: see HPI  Eyes: negative for sudden change in vision  EENT: no complaints  Cardiovascular: Negative for chest pain  Respiratory: Negative for shortness of breath  Gastrointestinal: Negative for nausea  Musculoskeletal: Negative for back pain   Neurological:  weakness  Psychiatric: Negative for anxiety  Integumentary: Negative for rashes or adenopathy     Physical Exam:  Vitals:    07/17/21 0930   BP: (!) 147/68   Pulse: 73   Resp: 16   Temp:    SpO2: 96%     Constitutional: NAD, well-developed, well-nourished. HEENT: MMM. Hearing intact. PERRL  Neck: no thyroid masses appreciated. Trachea is midline.  Neck appears unremarkable   Lymph: no palpable adenopathy in supraclavicular, or axillary lymph nodes  Cardiovascular: Regular rate. No peripheral edema  Respiratory: Respirations are even and non-labored. No audible breath sounds. Genitourinary: raphael with CYU  Abdomen: Soft. No distension, tenderness, hernias, masses or guarding. No CVA tenderness. No hernias appreciated. Liver and spleen appear normal  Psychiatric: A + O x 3, normal affect. Insight appears intact. Muskuloskeletal: DEBRA x 4   Skin: Pink, warm and dry. No rashes on face and arms. Labs:  Lab Results   Component Value Date    WBC 5.9 07/17/2021    HGB 12.1 07/17/2021    HCT 36.3 07/17/2021    MCV 97.3 07/17/2021     07/17/2021     Lab Results   Component Value Date    CREATININE 0.7 07/17/2021    BUN 12 07/17/2021     (L) 07/17/2021    K 3.4 (L) 07/17/2021     07/17/2021    CO2 20 (L) 07/17/2021       Narrative   EXAMINATION:   CT OF THE ABDOMEN AND PELVIS WITH CONTRAST 7/16/2021 12:28 am       TECHNIQUE:   CT of the abdomen and pelvis was performed with the administration of   intravenous contrast. Multiplanar reformatted images are provided for review.    Dose modulation, iterative reconstruction, and/or weight based adjustment of   the mA/kV was utilized to reduce the radiation dose to as low as reasonably   achievable.       COMPARISON:   None.       HISTORY:   ORDERING SYSTEM PROVIDED HISTORY: n/v/d   TECHNOLOGIST PROVIDED HISTORY:   Additional Contrast?->None   Reason for exam:->n/v/d   Decision Support Exception - unselect if not a suspected or confirmed   emergency medical condition->Emergency Medical Condition (MA)   Reason for Exam: fall, on blood thinners   Acuity: Acute   Type of Exam: Initial       FINDINGS:       Abdomen/Pelvis:       Lower chest: The lung bases are well aerated.  Pleural surfaces are   unremarkable and no evidence of pleural effusion is identified.  Heart is   moderately enlarged.       Organs: Multifocal Moderate cardiomegaly.          Gisselle Freeman MD, MD  7/17/2021

## 2021-07-17 NOTE — PROGRESS NOTES
Pt had multiple attempts at urinating in the br with very little success. Pt bladder scanned and found to have 975 ml in bladder. He placed per order and 900 ml UOP resulted at 0230.   Brittany White RN

## 2021-07-17 NOTE — PROGRESS NOTES
HOSPITALISTS PROGRESS NOTE    7/17/2021 1:27 PM        Name: Wallace Mcgarry . Admitted: 7/15/2021  Primary Care Provider: Court Roberto (Tel: [phone])      Problem List  Active Problems:    Acute gastroenteritis  Resolved Problems:    * No resolved hospital problems. *       Assessment & Plan:   Gastroenteritis continue to Campylobacter started on doxy    Urinary retention  He placed  We will get urology input might need He for few days    Hypokalemia and hypomagnesemia  Replace potassium and magnesium    Prolonged QTC  Recurrent falls at home PT OT consult    History of A. fib on Coumadin INR therapeutic    IV Access: Peripheral  He: Yes  Diet: ADULT DIET; Regular  Code:Full Code  DVT PPX Coumadin  Disposition monitor in the hospital for now      Brief Course:        CC: weakness     Subjective:  .     Overnight issues of urinary retention status post He  Patient does mention that she feels sicklish  No fever no chills  Diarrhea is getting better  Stool studies positive for Campylobacter    Reviewed interval ancillary notes    Current Medications  doxycycline hyclate (VIBRA-TABS) tablet 100 mg, 2 times per day  magnesium sulfate 2000 mg in 50 mL IVPB premix, Once  pantoprazole (PROTONIX) injection 40 mg, Daily  sodium chloride flush 0.9 % injection 5-40 mL, 2 times per day  sodium chloride flush 0.9 % injection 10 mL, PRN  0.9 % sodium chloride infusion, PRN  polyethylene glycol (GLYCOLAX) packet 17 g, Daily PRN  acetaminophen (TYLENOL) tablet 650 mg, Q6H PRN   Or  acetaminophen (TYLENOL) suppository 650 mg, Q6H PRN  meclizine (ANTIVERT) tablet 12.5 mg, TID PRN  amLODIPine (NORVASC) tablet 10 mg, Daily  donepezil (ARICEPT) tablet 5 mg, Nightly  levothyroxine (SYNTHROID) tablet 100 mcg, Daily  lisinopril (PRINIVIL;ZESTRIL) tablet 40 mg, Daily  metoprolol succinate (TOPROL XL) extended release tablet 25 mg, Daily  warfarin (COUMADIN) tablet 2 mg, Daily  hydrALAZINE (APRESOLINE) tablet 75 mg, BID  predniSONE (DELTASONE) tablet 5 mg, Daily with breakfast        Objective:  BP (!) 147/68   Pulse 73   Temp 98.2 °F (36.8 °C) (Temporal)   Resp 16   Ht 5' 2\" (1.575 m)   Wt 103 lb 8 oz (46.9 kg)   SpO2 96%   BMI 18.93 kg/m²     Intake/Output Summary (Last 24 hours) at 7/17/2021 1327  Last data filed at 7/17/2021 9235  Gross per 24 hour   Intake    Output 1400 ml   Net -1400 ml      Wt Readings from Last 3 Encounters:   07/17/21 103 lb 8 oz (46.9 kg)   Hard of hearing  No pedal edema  General appearance:  Appears comfortable  Eyes: Sclera clear. Pupils equal.  ENT: Moist oral mucosa. Trachea midline, no adenopathy. Cardiovascular: Regular rhythm, normal S1, S2. No murmur. No edema in lower extremities  Respiratory: Not using accessory muscles. Good inspiratory effort. Clear to auscultation bilaterally, no wheeze or crackles. GI: Abdomen soft, no tenderness, not distended, normal bowel sounds  Musculoskeletal: No cyanosis in digits, neck supple  Neurology: CN 2-12 grossly intact. No speech or motor deficits  Skin: Warm, dry, normal turgor  Extremity exam shows brisk capillary refill. Peripheral pulses are palpable in lower extremities     Labs and Tests:  CBC:   Recent Labs     07/15/21  2354 07/16/21  0955 07/16/21  1618 07/16/21  2147 07/17/21  0420   WBC 7.6  --   --   --  5.9   HGB 12.0   < > 11.9* 12.4 12.1     --   --   --  250    < > = values in this interval not displayed. BMP:    Recent Labs     07/15/21  2354 07/17/21  0420   * 134*   K 3.8 3.4*    105   CO2 19* 20*   BUN 23* 12   CREATININE 0.9 0.7   GLUCOSE 131* 106*     Hepatic:   Recent Labs     07/15/21  2354 07/17/21  0420   AST 27 29   ALT 14 15   BILITOT 0.4 0.4   ALKPHOS 53 46     CT ABDOMEN PELVIS W IV CONTRAST Additional Contrast? None   Final Result   1. Cholelithiasis, as discussed above.    2. Moderate-sized hiatal hernia. 3. Marked sigmoid colon diverticulosis without evidence of diverticulitis. 4. Splenic artery small partially calcified aneurysms, as discussed above. 5. Multifocal bilateral renal parenchymal simple cysts. 6. L4/L5 severe right neural foraminal stenosis secondary to encroachment by   disc osteophyte complex. 7. Moderate cardiomegaly. CT Cervical Spine WO Contrast   Final Result   No acute abnormality of the cervical spine. C4/C5 moderate right and mild left, C5/C6 severe bilateral neural foraminal   stenosis secondary to encroachment by disc osteophyte complex. CT Head WO Contrast   Final Result   1. No acute intracranial abnormality. 2. Severe cerebral white matter chronic microvascular ischemic disease. 3. Moderate diffuse cerebral atrophy. XR CHEST PORTABLE   Final Result   No acute fracture or dislocation. CHEST FINDINGS:   Cardiac silhouette is within normal limits in size. Mediastinal contours are   otherwise suboptimally evaluated due to rotated projection. Lungs demonstrate   no focal consolidation or pleural effusion. No pneumothorax appreciated on   this projection. IMPRESSION:   No airspace disease by radiograph. XR ELBOW RIGHT (MIN 3 VIEWS)   Final Result   No acute fracture or dislocation. CHEST FINDINGS:   Cardiac silhouette is within normal limits in size. Mediastinal contours are   otherwise suboptimally evaluated due to rotated projection. Lungs demonstrate   no focal consolidation or pleural effusion. No pneumothorax appreciated on   this projection. IMPRESSION:   No airspace disease by radiograph.              Discussed care with family          Yoly Henriquez MD   7/17/2021 1:27 PM

## 2021-07-18 VITALS
OXYGEN SATURATION: 93 % | SYSTOLIC BLOOD PRESSURE: 127 MMHG | TEMPERATURE: 97.7 F | RESPIRATION RATE: 16 BRPM | DIASTOLIC BLOOD PRESSURE: 59 MMHG | BODY MASS INDEX: 19.1 KG/M2 | HEART RATE: 68 BPM | WEIGHT: 103.8 LBS | HEIGHT: 62 IN

## 2021-07-18 LAB
INR BLD: 2.61 (ref 0.88–1.12)
PROTHROMBIN TIME: 30.7 SEC (ref 9.9–12.7)

## 2021-07-18 PROCEDURE — 2580000003 HC RX 258: Performed by: INTERNAL MEDICINE

## 2021-07-18 PROCEDURE — 6370000000 HC RX 637 (ALT 250 FOR IP): Performed by: INTERNAL MEDICINE

## 2021-07-18 PROCEDURE — G0378 HOSPITAL OBSERVATION PER HR: HCPCS

## 2021-07-18 PROCEDURE — 85610 PROTHROMBIN TIME: CPT

## 2021-07-18 PROCEDURE — 96366 THER/PROPH/DIAG IV INF ADDON: CPT

## 2021-07-18 PROCEDURE — 51798 US URINE CAPACITY MEASURE: CPT

## 2021-07-18 PROCEDURE — 36415 COLL VENOUS BLD VENIPUNCTURE: CPT

## 2021-07-18 PROCEDURE — 6360000002 HC RX W HCPCS: Performed by: INTERNAL MEDICINE

## 2021-07-18 PROCEDURE — C9113 INJ PANTOPRAZOLE SODIUM, VIA: HCPCS | Performed by: INTERNAL MEDICINE

## 2021-07-18 RX ORDER — DOXYCYCLINE HYCLATE 100 MG
100 TABLET ORAL EVERY 12 HOURS SCHEDULED
Qty: 4 TABLET | Refills: 0 | Status: SHIPPED | OUTPATIENT
Start: 2021-07-18 | End: 2021-07-20

## 2021-07-18 RX ORDER — MAGNESIUM SULFATE IN WATER 40 MG/ML
2000 INJECTION, SOLUTION INTRAVENOUS ONCE
Status: COMPLETED | OUTPATIENT
Start: 2021-07-18 | End: 2021-07-18

## 2021-07-18 RX ORDER — POTASSIUM CHLORIDE 20 MEQ/1
40 TABLET, EXTENDED RELEASE ORAL ONCE
Status: COMPLETED | OUTPATIENT
Start: 2021-07-18 | End: 2021-07-18

## 2021-07-18 RX ADMIN — MAGNESIUM SULFATE HEPTAHYDRATE 2000 MG: 40 INJECTION, SOLUTION INTRAVENOUS at 11:53

## 2021-07-18 RX ADMIN — AMLODIPINE BESYLATE 10 MG: 5 TABLET ORAL at 09:13

## 2021-07-18 RX ADMIN — LEVOTHYROXINE SODIUM 100 MCG: 0.1 TABLET ORAL at 09:16

## 2021-07-18 RX ADMIN — HYDRALAZINE HYDROCHLORIDE 75 MG: 25 TABLET, FILM COATED ORAL at 09:13

## 2021-07-18 RX ADMIN — Medication 10 ML: at 09:12

## 2021-07-18 RX ADMIN — DOXYCYCLINE HYCLATE 100 MG: 100 TABLET, COATED ORAL at 09:13

## 2021-07-18 RX ADMIN — PREDNISONE 5 MG: 5 TABLET ORAL at 09:16

## 2021-07-18 RX ADMIN — METOPROLOL SUCCINATE 25 MG: 25 TABLET, EXTENDED RELEASE ORAL at 09:14

## 2021-07-18 RX ADMIN — LISINOPRIL 40 MG: 20 TABLET ORAL at 09:13

## 2021-07-18 RX ADMIN — PANTOPRAZOLE SODIUM 40 MG: 40 INJECTION, POWDER, FOR SOLUTION INTRAVENOUS at 09:13

## 2021-07-18 RX ADMIN — POTASSIUM CHLORIDE 40 MEQ: 20 TABLET, EXTENDED RELEASE ORAL at 09:16

## 2021-07-18 NOTE — CARE COORDINATION
Referral called to [facility].     TIAGO MooreN, CCM, RN  [facility]  [phone]

## 2021-07-18 NOTE — PLAN OF CARE
Problem: Falls - Risk of:  Goal: Will remain free from falls  Description: Will remain free from falls  Note: Patient free from harm. ID bands on, bed in lowest position, call light in reach. Patient instructed to call for help if needed. Patient educated on ambulation and safety. Problem: Skin Integrity:  Goal: Will show no infection signs and symptoms  Description: Will show no infection signs and symptoms  Note: Pt has a skin tear on rt elbow. Pt rubs it on hte bed multiple times. Wound covered with mepilex.

## 2021-07-18 NOTE — DISCHARGE INSTR - COC
Pulse 68   Temp 97.7 °F (36.5 °C) (Temporal)   Resp 16   Ht 5' 2\" (1.575 m)   Wt 103 lb 12.8 oz (47.1 kg)   SpO2 93%   BMI 18.99 kg/m²     Last documented pain score (0-10 scale): Pain Level: 2  Last Weight:   Wt Readings from Last 1 Encounters:   07/18/21 103 lb 12.8 oz (47.1 kg)     Mental Status:  alert    IV Access:  - None    Nursing Mobility/ADLs:  Walking   Assisted  Transfer  Assisted  Bathing  Assisted  Dressing  Assisted  Toileting  Assisted  Feeding  Assisted  Med Admin  Dependent  Med Delivery   whole    Wound Care Documentation and Therapy:        Elimination:  Continence:   · Bowel: Yes  · Bladder: Yes  Urinary Catheter: Removal Date 07/18/2021   Colostomy/Ileostomy/Ileal Conduit: No       Date of Last BM: 7/16/2021  No intake or output data in the 24 hours ending 07/18/21 1037  No intake/output data recorded. Safety Concerns: At Risk for Falls    Impairments/Disabilities:      Hearing    Nutrition Therapy:  Current Nutrition Therapy:   - Oral Diet:  General    Routes of Feeding: Oral  Liquids: No Restrictions  Daily Fluid Restriction: no  Last Modified Barium Swallow with Video (Video Swallowing Test): not done    Treatments at the Time of Hospital Discharge:   Respiratory Treatments: none  Oxygen Therapy:  is not on home oxygen therapy.   Ventilator:    - No ventilator support    Rehab Therapies: Physical Therapy and Occupational Therapy  Weight Bearing Status/Restrictions: No weight bearing restirctions  Other Medical Equipment (for information only, NOT a DME order):  walker  Other Treatments: none    Patient's personal belongings (please select all that are sent with patient):  None    RN SIGNATURE:  Electronically signed by Eliazar Palafox RN on 7/18/21 at 1:42 PM EDT    CASE MANAGEMENT/SOCIAL WORK SECTION    Inpatient Status Date: OBS 7/15/2021    Readmission Risk Assessment Score:  Readmission Risk              Risk of Unplanned Readmission:  0           Discharging to Facility/ Agency    Name: Trista Tubbs PHONE; [phone]  · FAX: [phone]  ·     Dialysis Facility (if applicable)   · Name:  · Address:  · Dialysis Schedule:  · Phone:  · Fax:    / signature:EMMANUEL Acosta, CCM, RN  [address]    Prognosis: Fair    Condition at Discharge: Stable    Rehab Potential (if transferring to Rehab): Fair    Recommended Labs or Other Treatments After Discharge: pt/ot    Physician Certification: I certify the above information and transfer of Vinny Parks  is necessary for the continuing treatment of the diagnosis listed and that she requires Home Care for less 30 days.      Update Admission H&P: No change in H&P    PHYSICIAN SIGNATURE:  Electronically signed by Matty Salgado MD on 7/18/21 at 11:08 AM EDT

## 2021-07-18 NOTE — PROGRESS NOTES
Data- discharge order received, pt verbalized agreement to discharge, needs for [facility] with [address], JANIE reviewed and signed by MD, to be completed by RN. Action- AVS prepared, discharge instructions prepared and given to pt , medication information packet given r/t NEW or CHANGED prescriptions, pt verbalized understanding further self-review. D/C instruction summary: Diet- general Activity- as tolerated, follow up with Primary Care Physician Jassi Pate [phone] appointment to be scheduled by family, immunizations reviewed, medications prescriptions to be filled at [facility]. Contact information provided to above agencies used. Response- Case Management/ reported faxing completed JANIE and AVS to needed HHC/DME services stated above. Pt belongings gathered, IV removed, pt dressed. Disposition is home with HHC/DME as stated above, transported with , taken to lobby via w/c with PCA, no complications. 1. WEIGHT: Admit Weight: 98 lb (44.5 kg) (07/15/21 2210)        Today  Weight: 103 lb 12.8 oz (47.1 kg) (07/18/21 0724)       2.  O2 SAT.: SpO2: 93 % (07/18/21 0900)

## 2021-07-18 NOTE — PROGRESS NOTES
Urology Progress Note  [facility]    Provider: Sorin Machado MD MD Patient ID:  Admission Date: 7/15/2021 Name: Willie Mcgee Date: 7/15/2021 MRN: [id number]   Patient Location: [id number] : 1929  Attending: Ginger Mclaughlin MD Date of Service: 2021  PCP: Steven Kirk     Diagnoses:  1. Nausea vomiting and diarrhea    2. General weakness    3. Frequent falls    4. Skin tear of elbow without complication, right, initial encounter    5. Occult blood positive stool       Urinary retention     Assessment/Plan:  79 yo F admitted with Campylobacter GI infection and loose BM, also found to be in AUR with ~1 L in bladder when raphael placed. CT with moderate bladder distention. Normal Scr and no hydro. She denies any prior retention of bladder related issues.        -VT today  - If unable to void would start CIC   - consider urecholine however SE of diarrhea and abdominal cramping so will hold for now       Subjective:   Vance Orozco is a 80 y.o. female. She was seen and examined this morning.  She is tolerating raphael well but extremely eager for DC    Objective:   Vitals:  Vitals:    21 0430   BP: (!) 131/57   Pulse: 67   Resp: 16   Temp: 98.2 °F (36.8 °C)   SpO2: 93%     No intake or output data in the 24 hours ending 21 0717    Physical Exam:  Gen: Alert and oriented x3, no acute distress  Raphael CYU    Labs:  Lab Results   Component Value Date    WBC 5.9 2021    HGB 12.1 2021    HCT 36.3 2021    MCV 97.3 2021     2021     Lab Results   Component Value Date    CREATININE 0.7 2021    BUN 12 2021     (L) 2021    K 3.4 (L) 2021     2021    CO2 20 (L) 2021       Sorin Machado MD MD  2021

## 2021-07-18 NOTE — DISCHARGE SUMMARY
[facility] DISCHARGE SUMMARY    Patient Demographics    Patient. Nakul Polanco  Date of Birth. 1929  MRN. [id number]     Primary care provider. Britany Apgar  (Tel: [phone])    Admit date: 7/15/2021    Discharge date (blank if same as Note Date): Note Date: 2021     Reason for Hospitalization. No chief complaint on file. Significant Findings. Active Problems:    Acute gastroenteritis  Resolved Problems:    * No resolved hospital problems. *  C jejuni infection  Urinary retention      Problems and results from this hospitalization that need follow up. 1. Urinary retention    Significant test results and incidental findings. CT ABDOMEN PELVIS W IV CONTRAST Additional Contrast? None   Final Result   1. Cholelithiasis, as discussed above. 2. Moderate-sized hiatal hernia. 3. Marked sigmoid colon diverticulosis without evidence of diverticulitis. 4. Splenic artery small partially calcified aneurysms, as discussed above. 5. Multifocal bilateral renal parenchymal simple cysts. 6. L4/L5 severe right neural foraminal stenosis secondary to encroachment by   disc osteophyte complex. 7. Moderate cardiomegaly. CT Cervical Spine WO Contrast   Final Result   No acute abnormality of the cervical spine. C4/C5 moderate right and mild left, C5/C6 severe bilateral neural foraminal   stenosis secondary to encroachment by disc osteophyte complex. CT Head WO Contrast   Final Result   1. No acute intracranial abnormality. 2. Severe cerebral white matter chronic microvascular ischemic disease. 3. Moderate diffuse cerebral atrophy. XR CHEST PORTABLE   Final Result   No acute fracture or dislocation. CHEST FINDINGS:   Cardiac silhouette is within normal limits in size. Mediastinal contours are   otherwise suboptimally evaluated due to rotated projection. Lungs demonstrate   no focal consolidation or pleural effusion.  No pneumothorax appreciated on   this projection. IMPRESSION:   No airspace disease by radiograph. XR ELBOW RIGHT (MIN 3 VIEWS)   Final Result   No acute fracture or dislocation. CHEST FINDINGS:   Cardiac silhouette is within normal limits in size. Mediastinal contours are   otherwise suboptimally evaluated due to rotated projection. Lungs demonstrate   no focal consolidation or pleural effusion. No pneumothorax appreciated on   this projection. IMPRESSION:   No airspace disease by radiograph. Invasive procedures and treatments. [facility] Course. 77-year-old female with a history of atrial fibrillation hyperlipidemia hypertension hypothyroidism presenting with weakness second fall also was having significant diarrhea stool cultures positive for Campylobacter. Symptoms improved. Patient did have an episode of urinary retention during the stay for which urology was consulted. Patient did have a Hemoccult positive stools which might be related to infection. Patient discharged with home care services. Consults. IP CONSULT TO GI  IP CONSULT TO GI  IP CONSULT TO PALLIATIVE CARE  PHARMACY TO DOSE MEDICATION  IP CONSULT TO UROLOGY  IP CONSULT TO HOME CARE NEEDS    Physical examination on discharge day. BP (!) 127/59   Pulse 68   Temp 97.7 °F (36.5 °C) (Temporal)   Resp 16   Ht 5' 2\" (1.575 m)   Wt 103 lb 12.8 oz (47.1 kg)   SpO2 93%   BMI 18.99 kg/m²   General appearance. Alert. Looks comfortable. HEENT. Sclera clear. Moist mucus membranes. Cardiovascular. Regular rate and rhythm, normal S1, S2. No murmur. Respiratory. Not using accessory muscles. Clear to auscultation bilaterally, no wheeze. Gastrointestinal. Abdomen soft, non-tender, not distended, normal bowel sounds  Neurology. Facial symmetry. No speech deficits. Moving all extremities equally. Condition at time of discharge stable  Medication instructions provided to patient at discharge. Medication List      START taking these medications    doxycycline hyclate 100 MG tablet  Commonly known as: VIBRA-TABS  Take 1 tablet by mouth every 12 hours for 2 days        CONTINUE taking these medications    acetaminophen 500 MG tablet  Commonly known as: TYLENOL     alendronate 70 MG tablet  Commonly known as: FOSAMAX     amiodarone 200 MG tablet  Commonly known as: CORDARONE     amLODIPine 10 MG tablet  Commonly known as: NORVASC     Coumadin 2.5 MG tablet  Generic drug: warfarin     donepezil 5 MG tablet  Commonly known as: ARICEPT     levothyroxine 100 MCG tablet  Commonly known as: SYNTHROID     lisinopril 40 MG tablet  Commonly known as: PRINIVIL;ZESTRIL     METAMUCIL PO     niacin 500 MG extended release capsule     omeprazole 20 MG delayed release capsule  Commonly known as: PRILOSEC     ondansetron 4 MG disintegrating tablet  Commonly known as: ZOFRAN-ODT     ONE-A-DAY WOMENS PO        STOP taking these medications    nitrofurantoin (macrocrystal-monohydrate) 100 MG capsule  Commonly known as: MACROBID           Where to Get Your Medications      These medications were sent to [address] [place] [address] [address], [facility] [phone]  [address] [facility], [address] [place] [address]    Phone: [phone]   · doxycycline hyclate 100 MG tablet         Discharge recommendations given to patient. Follow Up. Primary care provider and urology  Disposition. home  Activity. activity as tolerated  Diet: ADULT DIET; Regular      Spent 35 minutes in discharge process.     Signed:  Adelfo Willams MD     7/18/2021 11:39 AM

## 2023-10-06 NOTE — PROGRESS NOTES
Clinical Pharmacy Note: Pharmacy to Dose Warfarin    Pharmacy consulted by Dr. Osvaldo Calles to dose warfarin. Vance Orozco is a 80 y.o. female  is receiving warfarin for indication: a-fib. INR Goal Range: 2.0-3.0  Prior to admission warfarin dosing regimen: 2 mg daily  INR today:   Lab Results   Component Value Date    INR 1.97 07/15/2021       Assessment/Plan:  INR is subtherapeutic on prior to admission dosing regimen. Possible concomitant drug-drug interactions include: prednisone APAP  Based on today's assessment, dose warfarin missed dose last night, continue 2 mg daily. Daily INR is ordered. Pharmacy will continue to monitor and make adjustments to regimen as necessary.      Thank you for the consult,     Dax Celestin RPh No